# Patient Record
Sex: MALE | Employment: OTHER | URBAN - METROPOLITAN AREA
[De-identification: names, ages, dates, MRNs, and addresses within clinical notes are randomized per-mention and may not be internally consistent; named-entity substitution may affect disease eponyms.]

---

## 2017-07-31 ENCOUNTER — HOSPITAL ENCOUNTER (OUTPATIENT)
Dept: OTHER | Age: 67
Discharge: HOME | End: 2017-07-31
Attending: FAMILY MEDICINE | Admitting: FAMILY MEDICINE

## 2017-07-31 DIAGNOSIS — M54.12 CERVICAL RADICULOPATHY: ICD-10-CM

## 2017-07-31 DIAGNOSIS — M54.12 RADICULOPATHY, CERVICAL: ICD-10-CM

## 2018-06-28 ENCOUNTER — HOSPITAL ENCOUNTER (OUTPATIENT)
Dept: OTHER | Age: 68
Discharge: HOME | End: 2018-06-28
Attending: FAMILY MEDICINE | Admitting: FAMILY MEDICINE

## 2018-06-28 DIAGNOSIS — M54.12 RADICULOPATHY OF CERVICAL REGION: ICD-10-CM

## 2018-06-28 PROCEDURE — 95912 NRV CNDJ TEST 11-12 STUDIES: CPT | Performed by: PSYCHIATRY & NEUROLOGY

## 2018-06-28 PROCEDURE — 95886 MUSC TEST DONE W/N TEST COMP: CPT | Performed by: PSYCHIATRY & NEUROLOGY

## 2019-03-11 ENCOUNTER — HOSPITAL ENCOUNTER (EMERGENCY)
Age: 69
Discharge: HOME | End: 2019-03-11
Attending: EMERGENCY MEDICINE
Payer: MEDICAID

## 2019-03-11 ENCOUNTER — APPOINTMENT (OUTPATIENT)
Dept: CT IMAGING | Age: 69
End: 2019-03-11
Payer: MEDICAID

## 2019-03-11 ENCOUNTER — APPOINTMENT (OUTPATIENT)
Dept: GENERAL RADIOLOGY | Age: 69
End: 2019-03-11
Payer: MEDICAID

## 2019-03-11 VITALS
OXYGEN SATURATION: 91 % | SYSTOLIC BLOOD PRESSURE: 156 MMHG | WEIGHT: 176.25 LBS | RESPIRATION RATE: 20 BRPM | BODY MASS INDEX: 27.6 KG/M2 | DIASTOLIC BLOOD PRESSURE: 99 MMHG | TEMPERATURE: 98 F | HEART RATE: 98 BPM

## 2019-03-11 DIAGNOSIS — R10.13 EPIGASTRIC PAIN: Primary | ICD-10-CM

## 2019-03-11 LAB
A/G RATIO: 1.2 (ref 1.1–2.2)
ALBUMIN SERPL-MCNC: 5.1 G/DL (ref 3.4–5)
ALP BLD-CCNC: 97 U/L (ref 40–129)
ALT SERPL-CCNC: 21 U/L (ref 10–40)
AMYLASE: 158 U/L (ref 25–115)
ANION GAP SERPL CALCULATED.3IONS-SCNC: 14 MMOL/L (ref 3–16)
AST SERPL-CCNC: 21 U/L (ref 15–37)
BASOPHILS ABSOLUTE: 0.1 K/UL (ref 0–0.2)
BASOPHILS RELATIVE PERCENT: 0.4 %
BILIRUB SERPL-MCNC: 0.8 MG/DL (ref 0–1)
BILIRUBIN URINE: NEGATIVE
BLOOD, URINE: NEGATIVE
BUN BLDV-MCNC: 10 MG/DL (ref 7–20)
CALCIUM SERPL-MCNC: 9.7 MG/DL (ref 8.3–10.6)
CHLORIDE BLD-SCNC: 99 MMOL/L (ref 99–110)
CLARITY: CLEAR
CO2: 28 MMOL/L (ref 21–32)
COLOR: YELLOW
CREAT SERPL-MCNC: 0.8 MG/DL (ref 0.8–1.3)
EOSINOPHILS ABSOLUTE: 0 K/UL (ref 0–0.6)
EOSINOPHILS RELATIVE PERCENT: 0.4 %
EPITHELIAL CELLS, UA: 0 /HPF (ref 0–5)
GFR AFRICAN AMERICAN: >60
GFR NON-AFRICAN AMERICAN: >60
GLOBULIN: 4.1 G/DL
GLUCOSE BLD-MCNC: 131 MG/DL (ref 70–99)
GLUCOSE URINE: NEGATIVE MG/DL
HCT VFR BLD CALC: 45.6 % (ref 40.5–52.5)
HEMOGLOBIN: 15.2 G/DL (ref 13.5–17.5)
HYALINE CASTS: 1 /LPF (ref 0–8)
KETONES, URINE: NEGATIVE MG/DL
LEUKOCYTE ESTERASE, URINE: NEGATIVE
LIPASE: 45 U/L (ref 13–60)
LYMPHOCYTES ABSOLUTE: 0.8 K/UL (ref 1–5.1)
LYMPHOCYTES RELATIVE PERCENT: 7 %
MCH RBC QN AUTO: 28.1 PG (ref 26–34)
MCHC RBC AUTO-ENTMCNC: 33.4 G/DL (ref 31–36)
MCV RBC AUTO: 84.1 FL (ref 80–100)
MICROSCOPIC EXAMINATION: YES
MONOCYTES ABSOLUTE: 0.4 K/UL (ref 0–1.3)
MONOCYTES RELATIVE PERCENT: 3.1 %
NEUTROPHILS ABSOLUTE: 10.6 K/UL (ref 1.7–7.7)
NEUTROPHILS RELATIVE PERCENT: 89.1 %
NITRITE, URINE: NEGATIVE
PDW BLD-RTO: 13.4 % (ref 12.4–15.4)
PH UA: 7 (ref 5–8)
PLATELET # BLD: 222 K/UL (ref 135–450)
PMV BLD AUTO: 10.8 FL (ref 5–10.5)
POTASSIUM SERPL-SCNC: 3.9 MMOL/L (ref 3.5–5.1)
PROTEIN UA: 100 MG/DL
RBC # BLD: 5.42 M/UL (ref 4.2–5.9)
RBC UA: 1 /HPF (ref 0–4)
SODIUM BLD-SCNC: 141 MMOL/L (ref 136–145)
SPECIFIC GRAVITY UA: 1.01 (ref 1–1.03)
TOTAL PROTEIN: 9.2 G/DL (ref 6.4–8.2)
TROPONIN: <0.01 NG/ML
URINE TYPE: ABNORMAL
UROBILINOGEN, URINE: 0.2 E.U./DL
WBC # BLD: 11.9 K/UL (ref 4–11)
WBC UA: 1 /HPF (ref 0–5)

## 2019-03-11 PROCEDURE — 74177 CT ABD & PELVIS W/CONTRAST: CPT

## 2019-03-11 PROCEDURE — 93010 ELECTROCARDIOGRAM REPORT: CPT | Performed by: INTERNAL MEDICINE

## 2019-03-11 PROCEDURE — 6360000002 HC RX W HCPCS: Performed by: NURSE PRACTITIONER

## 2019-03-11 PROCEDURE — 71045 X-RAY EXAM CHEST 1 VIEW: CPT

## 2019-03-11 PROCEDURE — 2580000003 HC RX 258: Performed by: NURSE PRACTITIONER

## 2019-03-11 PROCEDURE — 6370000000 HC RX 637 (ALT 250 FOR IP): Performed by: NURSE PRACTITIONER

## 2019-03-11 PROCEDURE — 6360000004 HC RX CONTRAST MEDICATION: Performed by: NURSE PRACTITIONER

## 2019-03-11 PROCEDURE — 93005 ELECTROCARDIOGRAM TRACING: CPT | Performed by: NURSE PRACTITIONER

## 2019-03-11 PROCEDURE — 96375 TX/PRO/DX INJ NEW DRUG ADDON: CPT

## 2019-03-11 PROCEDURE — 96374 THER/PROPH/DIAG INJ IV PUSH: CPT

## 2019-03-11 PROCEDURE — 80053 COMPREHEN METABOLIC PANEL: CPT

## 2019-03-11 PROCEDURE — 96361 HYDRATE IV INFUSION ADD-ON: CPT

## 2019-03-11 PROCEDURE — 84484 ASSAY OF TROPONIN QUANT: CPT

## 2019-03-11 PROCEDURE — 99284 EMERGENCY DEPT VISIT MOD MDM: CPT

## 2019-03-11 PROCEDURE — 81001 URINALYSIS AUTO W/SCOPE: CPT

## 2019-03-11 PROCEDURE — 85025 COMPLETE CBC W/AUTO DIFF WBC: CPT

## 2019-03-11 PROCEDURE — 83690 ASSAY OF LIPASE: CPT

## 2019-03-11 PROCEDURE — 82150 ASSAY OF AMYLASE: CPT

## 2019-03-11 RX ORDER — METOPROLOL SUCCINATE 100 MG/1
100 TABLET, EXTENDED RELEASE ORAL DAILY
COMMUNITY

## 2019-03-11 RX ORDER — TRIAMTERENE AND HYDROCHLOROTHIAZIDE 37.5; 25 MG/1; MG/1
1 TABLET ORAL DAILY
COMMUNITY

## 2019-03-11 RX ORDER — MORPHINE SULFATE 4 MG/ML
4 INJECTION, SOLUTION INTRAMUSCULAR; INTRAVENOUS ONCE
Status: COMPLETED | OUTPATIENT
Start: 2019-03-11 | End: 2019-03-11

## 2019-03-11 RX ORDER — TIZANIDINE 2 MG/1
2 TABLET ORAL DAILY
COMMUNITY

## 2019-03-11 RX ORDER — DIPHENHYDRAMINE HYDROCHLORIDE 50 MG/ML
25 INJECTION INTRAMUSCULAR; INTRAVENOUS ONCE
Status: COMPLETED | OUTPATIENT
Start: 2019-03-11 | End: 2019-03-11

## 2019-03-11 RX ORDER — OMEPRAZOLE 20 MG/1
20 CAPSULE, DELAYED RELEASE ORAL DAILY
Qty: 30 CAPSULE | Refills: 0 | Status: SHIPPED | OUTPATIENT
Start: 2019-03-11 | End: 2019-04-10

## 2019-03-11 RX ORDER — LISINOPRIL 20 MG/1
20 TABLET ORAL DAILY
COMMUNITY

## 2019-03-11 RX ORDER — 0.9 % SODIUM CHLORIDE 0.9 %
1000 INTRAVENOUS SOLUTION INTRAVENOUS ONCE
Status: COMPLETED | OUTPATIENT
Start: 2019-03-11 | End: 2019-03-11

## 2019-03-11 RX ORDER — GABAPENTIN 100 MG/1
100 CAPSULE ORAL 3 TIMES DAILY
COMMUNITY

## 2019-03-11 RX ORDER — TAMSULOSIN HYDROCHLORIDE 0.4 MG/1
0.4 CAPSULE ORAL DAILY
COMMUNITY

## 2019-03-11 RX ORDER — ONDANSETRON 2 MG/ML
4 INJECTION INTRAMUSCULAR; INTRAVENOUS ONCE
Status: COMPLETED | OUTPATIENT
Start: 2019-03-11 | End: 2019-03-11

## 2019-03-11 RX ADMIN — ONDANSETRON 4 MG: 2 INJECTION INTRAMUSCULAR; INTRAVENOUS at 16:26

## 2019-03-11 RX ADMIN — DIPHENHYDRAMINE HYDROCHLORIDE 25 MG: 50 INJECTION INTRAMUSCULAR; INTRAVENOUS at 16:45

## 2019-03-11 RX ADMIN — IOPAMIDOL 75 ML: 755 INJECTION, SOLUTION INTRAVENOUS at 17:14

## 2019-03-11 RX ADMIN — LIDOCAINE HYDROCHLORIDE: 20 SOLUTION ORAL; TOPICAL at 16:43

## 2019-03-11 RX ADMIN — MORPHINE SULFATE 4 MG: 4 INJECTION INTRAVENOUS at 16:26

## 2019-03-11 RX ADMIN — SODIUM CHLORIDE 1000 ML: 9 INJECTION, SOLUTION INTRAVENOUS at 16:25

## 2019-03-11 ASSESSMENT — PAIN DESCRIPTION - LOCATION: LOCATION: ABDOMEN

## 2019-03-11 ASSESSMENT — ENCOUNTER SYMPTOMS
CONSTIPATION: 0
BLOOD IN STOOL: 0
ABDOMINAL PAIN: 1
SORE THROAT: 0
RHINORRHEA: 0
SHORTNESS OF BREATH: 0
NAUSEA: 1
DIARRHEA: 0
VOMITING: 1

## 2019-03-11 ASSESSMENT — PAIN DESCRIPTION - PAIN TYPE: TYPE: ACUTE PAIN

## 2019-03-12 LAB
EKG ATRIAL RATE: 93 BPM
EKG DIAGNOSIS: NORMAL
EKG P AXIS: 53 DEGREES
EKG P-R INTERVAL: 204 MS
EKG Q-T INTERVAL: 378 MS
EKG QRS DURATION: 98 MS
EKG QTC CALCULATION (BAZETT): 469 MS
EKG R AXIS: -47 DEGREES
EKG T AXIS: 28 DEGREES
EKG VENTRICULAR RATE: 93 BPM

## 2021-01-01 ENCOUNTER — HOSPITAL ENCOUNTER (INPATIENT)
Age: 71
LOS: 12 days | DRG: 196 | End: 2021-02-15
Attending: EMERGENCY MEDICINE | Admitting: INTERNAL MEDICINE
Payer: MEDICAID

## 2021-01-01 ENCOUNTER — APPOINTMENT (OUTPATIENT)
Dept: GENERAL RADIOLOGY | Age: 71
DRG: 196 | End: 2021-01-01
Payer: MEDICAID

## 2021-01-01 ENCOUNTER — APPOINTMENT (OUTPATIENT)
Dept: CT IMAGING | Age: 71
DRG: 196 | End: 2021-01-01
Payer: MEDICAID

## 2021-01-01 VITALS
HEIGHT: 67 IN | HEART RATE: 112 BPM | RESPIRATION RATE: 31 BRPM | OXYGEN SATURATION: 44 % | DIASTOLIC BLOOD PRESSURE: 63 MMHG | TEMPERATURE: 105.1 F | BODY MASS INDEX: 29.07 KG/M2 | SYSTOLIC BLOOD PRESSURE: 121 MMHG | WEIGHT: 185.2 LBS

## 2021-01-01 DIAGNOSIS — I46.9 CARDIAC ARREST (HCC): Primary | ICD-10-CM

## 2021-01-01 LAB
A/G RATIO: 1.3 (ref 1.1–2.2)
ABO/RH: NORMAL
ALBUMIN SERPL-MCNC: 3.4 G/DL (ref 3.4–5)
ALP BLD-CCNC: 94 U/L (ref 40–129)
ALT SERPL-CCNC: 101 U/L (ref 10–40)
ALT SERPL-CCNC: 41 U/L (ref 10–40)
ANION GAP SERPL CALCULATED.3IONS-SCNC: 10 MMOL/L (ref 3–16)
ANION GAP SERPL CALCULATED.3IONS-SCNC: 14 MMOL/L (ref 3–16)
ANION GAP SERPL CALCULATED.3IONS-SCNC: 15 MMOL/L (ref 3–16)
ANION GAP SERPL CALCULATED.3IONS-SCNC: 21 MMOL/L (ref 3–16)
ANION GAP SERPL CALCULATED.3IONS-SCNC: 4 MMOL/L (ref 3–16)
ANION GAP SERPL CALCULATED.3IONS-SCNC: 5 MMOL/L (ref 3–16)
ANION GAP SERPL CALCULATED.3IONS-SCNC: 7 MMOL/L (ref 3–16)
ANION GAP SERPL CALCULATED.3IONS-SCNC: 8 MMOL/L (ref 3–16)
ANION GAP SERPL CALCULATED.3IONS-SCNC: 8 MMOL/L (ref 3–16)
ANTIBODY SCREEN: NORMAL
AST SERPL-CCNC: 106 U/L (ref 15–37)
AST SERPL-CCNC: 63 U/L (ref 15–37)
BANDED NEUTROPHILS RELATIVE PERCENT: 1 % (ref 0–7)
BANDED NEUTROPHILS RELATIVE PERCENT: 2 % (ref 0–7)
BANDED NEUTROPHILS RELATIVE PERCENT: 2 % (ref 0–7)
BASE EXCESS ARTERIAL: -6.7 MMOL/L (ref -3–3)
BASE EXCESS ARTERIAL: -9.2 MMOL/L (ref -3–3)
BASE EXCESS ARTERIAL: 1.1 MMOL/L (ref -3–3)
BASE EXCESS ARTERIAL: 2.2 MMOL/L (ref -3–3)
BASE EXCESS ARTERIAL: 2.5 MMOL/L (ref -3–3)
BASE EXCESS VENOUS: -9.3 MMOL/L (ref -3–3)
BASOPHILS ABSOLUTE: 0 K/UL (ref 0–0.2)
BASOPHILS ABSOLUTE: 0.1 K/UL (ref 0–0.2)
BASOPHILS RELATIVE PERCENT: 0 %
BASOPHILS RELATIVE PERCENT: 0 %
BASOPHILS RELATIVE PERCENT: 0.1 %
BASOPHILS RELATIVE PERCENT: 0.3 %
BASOPHILS RELATIVE PERCENT: 0.5 %
BASOPHILS RELATIVE PERCENT: 0.5 %
BASOPHILS RELATIVE PERCENT: 0.9 %
BASOPHILS RELATIVE PERCENT: 1 %
BILIRUB SERPL-MCNC: <0.2 MG/DL (ref 0–1)
BLOOD CULTURE, ROUTINE: NORMAL
BUN BLDV-MCNC: 12 MG/DL (ref 7–20)
BUN BLDV-MCNC: 15 MG/DL (ref 7–20)
BUN BLDV-MCNC: 15 MG/DL (ref 7–20)
BUN BLDV-MCNC: 17 MG/DL (ref 7–20)
BUN BLDV-MCNC: 17 MG/DL (ref 7–20)
BUN BLDV-MCNC: 20 MG/DL (ref 7–20)
BUN BLDV-MCNC: 22 MG/DL (ref 7–20)
BUN BLDV-MCNC: 23 MG/DL (ref 7–20)
BUN BLDV-MCNC: 23 MG/DL (ref 7–20)
BUN BLDV-MCNC: 26 MG/DL (ref 7–20)
BUN BLDV-MCNC: 30 MG/DL (ref 7–20)
CALCIUM SERPL-MCNC: 7.6 MG/DL (ref 8.3–10.6)
CALCIUM SERPL-MCNC: 7.6 MG/DL (ref 8.3–10.6)
CALCIUM SERPL-MCNC: 7.8 MG/DL (ref 8.3–10.6)
CALCIUM SERPL-MCNC: 7.9 MG/DL (ref 8.3–10.6)
CALCIUM SERPL-MCNC: 8 MG/DL (ref 8.3–10.6)
CALCIUM SERPL-MCNC: 8 MG/DL (ref 8.3–10.6)
CALCIUM SERPL-MCNC: 8.1 MG/DL (ref 8.3–10.6)
CALCIUM SERPL-MCNC: 8.2 MG/DL (ref 8.3–10.6)
CALCIUM SERPL-MCNC: 8.8 MG/DL (ref 8.3–10.6)
CARBOXYHEMOGLOBIN ARTERIAL: 0.5 % (ref 0–1.5)
CARBOXYHEMOGLOBIN ARTERIAL: 0.8 % (ref 0–1.5)
CARBOXYHEMOGLOBIN ARTERIAL: 0.9 % (ref 0–1.5)
CARBOXYHEMOGLOBIN ARTERIAL: 0.9 % (ref 0–1.5)
CARBOXYHEMOGLOBIN ARTERIAL: 1.1 % (ref 0–1.5)
CARBOXYHEMOGLOBIN: 2.1 % (ref 0–1.5)
CHLORIDE BLD-SCNC: 100 MMOL/L (ref 99–110)
CHLORIDE BLD-SCNC: 101 MMOL/L (ref 99–110)
CHLORIDE BLD-SCNC: 105 MMOL/L (ref 99–110)
CHLORIDE BLD-SCNC: 106 MMOL/L (ref 99–110)
CHLORIDE BLD-SCNC: 106 MMOL/L (ref 99–110)
CHLORIDE BLD-SCNC: 108 MMOL/L (ref 99–110)
CHLORIDE BLD-SCNC: 108 MMOL/L (ref 99–110)
CHLORIDE BLD-SCNC: 111 MMOL/L (ref 99–110)
CHLORIDE BLD-SCNC: 111 MMOL/L (ref 99–110)
CO2: 19 MMOL/L (ref 21–32)
CO2: 20 MMOL/L (ref 21–32)
CO2: 24 MMOL/L (ref 21–32)
CO2: 24 MMOL/L (ref 21–32)
CO2: 25 MMOL/L (ref 21–32)
CO2: 26 MMOL/L (ref 21–32)
CO2: 26 MMOL/L (ref 21–32)
CO2: 27 MMOL/L (ref 21–32)
CO2: 28 MMOL/L (ref 21–32)
CO2: 29 MMOL/L (ref 21–32)
CO2: 29 MMOL/L (ref 21–32)
CREAT SERPL-MCNC: 0.6 MG/DL (ref 0.8–1.3)
CREAT SERPL-MCNC: 0.7 MG/DL (ref 0.8–1.3)
CREAT SERPL-MCNC: 0.7 MG/DL (ref 0.8–1.3)
CREAT SERPL-MCNC: 0.8 MG/DL (ref 0.8–1.3)
CREAT SERPL-MCNC: 0.9 MG/DL (ref 0.8–1.3)
CREAT SERPL-MCNC: 1 MG/DL (ref 0.8–1.3)
CREAT SERPL-MCNC: 1 MG/DL (ref 0.8–1.3)
CREAT SERPL-MCNC: 1.6 MG/DL (ref 0.8–1.3)
CREAT SERPL-MCNC: 2 MG/DL (ref 0.8–1.3)
CULTURE, RESPIRATORY: ABNORMAL
CULTURE, RESPIRATORY: ABNORMAL
EKG ATRIAL RATE: 76 BPM
EKG DIAGNOSIS: NORMAL
EKG P-R INTERVAL: 152 MS
EKG Q-T INTERVAL: 452 MS
EKG QRS DURATION: 156 MS
EKG QTC CALCULATION (BAZETT): 619 MS
EKG R AXIS: 269 DEGREES
EKG T AXIS: 227 DEGREES
EKG VENTRICULAR RATE: 113 BPM
EOSINOPHILS ABSOLUTE: 0 K/UL (ref 0–0.6)
EOSINOPHILS ABSOLUTE: 0.2 K/UL (ref 0–0.6)
EOSINOPHILS ABSOLUTE: 0.3 K/UL (ref 0–0.6)
EOSINOPHILS ABSOLUTE: 0.6 K/UL (ref 0–0.6)
EOSINOPHILS ABSOLUTE: 0.8 K/UL (ref 0–0.6)
EOSINOPHILS ABSOLUTE: 0.8 K/UL (ref 0–0.6)
EOSINOPHILS ABSOLUTE: 0.9 K/UL (ref 0–0.6)
EOSINOPHILS ABSOLUTE: 2.3 K/UL (ref 0–0.6)
EOSINOPHILS RELATIVE PERCENT: 0 %
EOSINOPHILS RELATIVE PERCENT: 0.1 %
EOSINOPHILS RELATIVE PERCENT: 0.3 %
EOSINOPHILS RELATIVE PERCENT: 1.2 %
EOSINOPHILS RELATIVE PERCENT: 2.6 %
EOSINOPHILS RELATIVE PERCENT: 20 %
EOSINOPHILS RELATIVE PERCENT: 4.4 %
EOSINOPHILS RELATIVE PERCENT: 6 %
EOSINOPHILS RELATIVE PERCENT: 7.4 %
EOSINOPHILS RELATIVE PERCENT: 7.5 %
GFR AFRICAN AMERICAN: 40
GFR AFRICAN AMERICAN: 52
GFR AFRICAN AMERICAN: >60
GFR NON-AFRICAN AMERICAN: 33
GFR NON-AFRICAN AMERICAN: 43
GFR NON-AFRICAN AMERICAN: >60
GLOBULIN: 2.6 G/DL
GLUCOSE BLD-MCNC: 106 MG/DL (ref 70–99)
GLUCOSE BLD-MCNC: 109 MG/DL (ref 70–99)
GLUCOSE BLD-MCNC: 111 MG/DL (ref 70–99)
GLUCOSE BLD-MCNC: 123 MG/DL (ref 70–99)
GLUCOSE BLD-MCNC: 123 MG/DL (ref 70–99)
GLUCOSE BLD-MCNC: 125 MG/DL (ref 70–99)
GLUCOSE BLD-MCNC: 131 MG/DL (ref 70–99)
GLUCOSE BLD-MCNC: 133 MG/DL (ref 70–99)
GLUCOSE BLD-MCNC: 134 MG/DL (ref 70–99)
GLUCOSE BLD-MCNC: 138 MG/DL (ref 70–99)
GLUCOSE BLD-MCNC: 143 MG/DL (ref 70–99)
GLUCOSE BLD-MCNC: 155 MG/DL (ref 70–99)
GLUCOSE BLD-MCNC: 167 MG/DL (ref 70–99)
GLUCOSE BLD-MCNC: 170 MG/DL (ref 70–99)
GLUCOSE BLD-MCNC: 181 MG/DL (ref 70–99)
GLUCOSE BLD-MCNC: 258 MG/DL (ref 70–99)
GLUCOSE BLD-MCNC: 96 MG/DL (ref 70–99)
GLUCOSE BLD-MCNC: 96 MG/DL (ref 70–99)
GRAM STAIN RESULT: ABNORMAL
HCO3 ARTERIAL: 18.2 MMOL/L (ref 21–29)
HCO3 ARTERIAL: 21.9 MMOL/L (ref 21–29)
HCO3 ARTERIAL: 25.6 MMOL/L (ref 21–29)
HCO3 ARTERIAL: 25.9 MMOL/L (ref 21–29)
HCO3 ARTERIAL: 28 MMOL/L (ref 21–29)
HCO3 VENOUS: 23.2 MMOL/L (ref 23–29)
HCT VFR BLD CALC: 31.8 % (ref 40.5–52.5)
HCT VFR BLD CALC: 31.8 % (ref 40.5–52.5)
HCT VFR BLD CALC: 32 % (ref 40.5–52.5)
HCT VFR BLD CALC: 32.5 % (ref 40.5–52.5)
HCT VFR BLD CALC: 32.6 % (ref 40.5–52.5)
HCT VFR BLD CALC: 34.5 % (ref 40.5–52.5)
HCT VFR BLD CALC: 35.8 % (ref 40.5–52.5)
HCT VFR BLD CALC: 36.5 % (ref 40.5–52.5)
HCT VFR BLD CALC: 40.9 % (ref 40.5–52.5)
HCT VFR BLD CALC: 43.5 % (ref 40.5–52.5)
HCT VFR BLD CALC: 49.4 % (ref 40.5–52.5)
HEMOGLOBIN, ART, EXTENDED: 10.8 G/DL (ref 13.5–17.5)
HEMOGLOBIN, ART, EXTENDED: 10.9 G/DL (ref 13.5–17.5)
HEMOGLOBIN, ART, EXTENDED: 11 G/DL (ref 13.5–17.5)
HEMOGLOBIN, ART, EXTENDED: 15.4 G/DL (ref 13.5–17.5)
HEMOGLOBIN, ART, EXTENDED: 16 G/DL (ref 13.5–17.5)
HEMOGLOBIN, VEN, REDUCED: 18 %
HEMOGLOBIN: 10.2 G/DL (ref 13.5–17.5)
HEMOGLOBIN: 10.4 G/DL (ref 13.5–17.5)
HEMOGLOBIN: 10.5 G/DL (ref 13.5–17.5)
HEMOGLOBIN: 10.5 G/DL (ref 13.5–17.5)
HEMOGLOBIN: 10.7 G/DL (ref 13.5–17.5)
HEMOGLOBIN: 11.2 G/DL (ref 13.5–17.5)
HEMOGLOBIN: 11.7 G/DL (ref 13.5–17.5)
HEMOGLOBIN: 11.8 G/DL (ref 13.5–17.5)
HEMOGLOBIN: 13.3 G/DL (ref 13.5–17.5)
HEMOGLOBIN: 13.7 G/DL (ref 13.5–17.5)
HEMOGLOBIN: 15.7 G/DL (ref 13.5–17.5)
INR BLD: 1.14 (ref 0.86–1.14)
KEPPRA DOSE AMT: NORMAL
KEPPRA: 29.6 UG/ML (ref 6–46)
LV EF: 50 %
LVEF MODALITY: NORMAL
LYMPHOCYTES ABSOLUTE: 0.5 K/UL (ref 1–5.1)
LYMPHOCYTES ABSOLUTE: 0.8 K/UL (ref 1–5.1)
LYMPHOCYTES ABSOLUTE: 0.8 K/UL (ref 1–5.1)
LYMPHOCYTES ABSOLUTE: 0.9 K/UL (ref 1–5.1)
LYMPHOCYTES ABSOLUTE: 1.1 K/UL (ref 1–5.1)
LYMPHOCYTES ABSOLUTE: 1.3 K/UL (ref 1–5.1)
LYMPHOCYTES ABSOLUTE: 1.4 K/UL (ref 1–5.1)
LYMPHOCYTES ABSOLUTE: 1.4 K/UL (ref 1–5.1)
LYMPHOCYTES ABSOLUTE: 2 K/UL (ref 1–5.1)
LYMPHOCYTES ABSOLUTE: 4.8 K/UL (ref 1–5.1)
LYMPHOCYTES RELATIVE PERCENT: 10 %
LYMPHOCYTES RELATIVE PERCENT: 18 %
LYMPHOCYTES RELATIVE PERCENT: 2 %
LYMPHOCYTES RELATIVE PERCENT: 4.8 %
LYMPHOCYTES RELATIVE PERCENT: 43.4 %
LYMPHOCYTES RELATIVE PERCENT: 5.1 %
LYMPHOCYTES RELATIVE PERCENT: 6.8 %
LYMPHOCYTES RELATIVE PERCENT: 7.4 %
LYMPHOCYTES RELATIVE PERCENT: 7.4 %
LYMPHOCYTES RELATIVE PERCENT: 9.3 %
MAGNESIUM: 2.5 MG/DL (ref 1.8–2.4)
MCH RBC QN AUTO: 27.6 PG (ref 26–34)
MCH RBC QN AUTO: 27.7 PG (ref 26–34)
MCH RBC QN AUTO: 27.8 PG (ref 26–34)
MCH RBC QN AUTO: 27.9 PG (ref 26–34)
MCH RBC QN AUTO: 28.1 PG (ref 26–34)
MCH RBC QN AUTO: 28.1 PG (ref 26–34)
MCH RBC QN AUTO: 28.2 PG (ref 26–34)
MCH RBC QN AUTO: 28.3 PG (ref 26–34)
MCH RBC QN AUTO: 29 PG (ref 26–34)
MCHC RBC AUTO-ENTMCNC: 31.6 G/DL (ref 31–36)
MCHC RBC AUTO-ENTMCNC: 31.8 G/DL (ref 31–36)
MCHC RBC AUTO-ENTMCNC: 32.1 G/DL (ref 31–36)
MCHC RBC AUTO-ENTMCNC: 32.3 G/DL (ref 31–36)
MCHC RBC AUTO-ENTMCNC: 32.3 G/DL (ref 31–36)
MCHC RBC AUTO-ENTMCNC: 32.4 G/DL (ref 31–36)
MCHC RBC AUTO-ENTMCNC: 32.4 G/DL (ref 31–36)
MCHC RBC AUTO-ENTMCNC: 32.6 G/DL (ref 31–36)
MCHC RBC AUTO-ENTMCNC: 32.6 G/DL (ref 31–36)
MCHC RBC AUTO-ENTMCNC: 32.8 G/DL (ref 31–36)
MCHC RBC AUTO-ENTMCNC: 33.5 G/DL (ref 31–36)
MCV RBC AUTO: 85.2 FL (ref 80–100)
MCV RBC AUTO: 85.2 FL (ref 80–100)
MCV RBC AUTO: 86 FL (ref 80–100)
MCV RBC AUTO: 86 FL (ref 80–100)
MCV RBC AUTO: 86.4 FL (ref 80–100)
MCV RBC AUTO: 86.4 FL (ref 80–100)
MCV RBC AUTO: 86.6 FL (ref 80–100)
MCV RBC AUTO: 86.8 FL (ref 80–100)
MCV RBC AUTO: 87 FL (ref 80–100)
MCV RBC AUTO: 87.9 FL (ref 80–100)
MCV RBC AUTO: 89.6 FL (ref 80–100)
METAMYELOCYTES RELATIVE PERCENT: 2 %
METAMYELOCYTES RELATIVE PERCENT: 2 %
METHEMOGLOBIN ARTERIAL: 0.3 %
METHEMOGLOBIN ARTERIAL: 0.4 %
METHEMOGLOBIN ARTERIAL: 0.5 %
METHEMOGLOBIN ARTERIAL: 0.6 %
METHEMOGLOBIN ARTERIAL: 0.8 %
METHEMOGLOBIN VENOUS: 0.3 %
MONOCYTES ABSOLUTE: 0.5 K/UL (ref 0–1.3)
MONOCYTES ABSOLUTE: 0.8 K/UL (ref 0–1.3)
MONOCYTES ABSOLUTE: 0.9 K/UL (ref 0–1.3)
MONOCYTES ABSOLUTE: 1 K/UL (ref 0–1.3)
MONOCYTES ABSOLUTE: 1.2 K/UL (ref 0–1.3)
MONOCYTES ABSOLUTE: 1.2 K/UL (ref 0–1.3)
MONOCYTES ABSOLUTE: 1.5 K/UL (ref 0–1.3)
MONOCYTES ABSOLUTE: 1.5 K/UL (ref 0–1.3)
MONOCYTES ABSOLUTE: 1.6 K/UL (ref 0–1.3)
MONOCYTES ABSOLUTE: 1.9 K/UL (ref 0–1.3)
MONOCYTES RELATIVE PERCENT: 10 %
MONOCYTES RELATIVE PERCENT: 11 %
MONOCYTES RELATIVE PERCENT: 11.3 %
MONOCYTES RELATIVE PERCENT: 3 %
MONOCYTES RELATIVE PERCENT: 4.1 %
MONOCYTES RELATIVE PERCENT: 7 %
MONOCYTES RELATIVE PERCENT: 8.1 %
MONOCYTES RELATIVE PERCENT: 8.4 %
MONOCYTES RELATIVE PERCENT: 8.5 %
MONOCYTES RELATIVE PERCENT: 8.7 %
MYELOCYTE PERCENT: 2 %
NEUTROPHILS ABSOLUTE: 11.1 K/UL (ref 1.7–7.7)
NEUTROPHILS ABSOLUTE: 16.3 K/UL (ref 1.7–7.7)
NEUTROPHILS ABSOLUTE: 19.8 K/UL (ref 1.7–7.7)
NEUTROPHILS ABSOLUTE: 38.5 K/UL (ref 1.7–7.7)
NEUTROPHILS ABSOLUTE: 5.4 K/UL (ref 1.7–7.7)
NEUTROPHILS ABSOLUTE: 6.2 K/UL (ref 1.7–7.7)
NEUTROPHILS ABSOLUTE: 7.8 K/UL (ref 1.7–7.7)
NEUTROPHILS ABSOLUTE: 8.5 K/UL (ref 1.7–7.7)
NEUTROPHILS ABSOLUTE: 9.1 K/UL (ref 1.7–7.7)
NEUTROPHILS ABSOLUTE: 9.8 K/UL (ref 1.7–7.7)
NEUTROPHILS RELATIVE PERCENT: 49 %
NEUTROPHILS RELATIVE PERCENT: 49 %
NEUTROPHILS RELATIVE PERCENT: 69 %
NEUTROPHILS RELATIVE PERCENT: 73.6 %
NEUTROPHILS RELATIVE PERCENT: 76 %
NEUTROPHILS RELATIVE PERCENT: 76.5 %
NEUTROPHILS RELATIVE PERCENT: 83.1 %
NEUTROPHILS RELATIVE PERCENT: 85.6 %
NEUTROPHILS RELATIVE PERCENT: 86.5 %
NEUTROPHILS RELATIVE PERCENT: 93 %
O2 CONTENT ARTERIAL: 14 ML/DL
O2 CONTENT ARTERIAL: 15 ML/DL
O2 CONTENT ARTERIAL: 15 ML/DL
O2 CONTENT ARTERIAL: 21 ML/DL
O2 CONTENT ARTERIAL: 21 ML/DL
O2 CONTENT, VEN: 16 VOL %
O2 SAT, ARTERIAL: 92.8 %
O2 SAT, ARTERIAL: 94.8 %
O2 SAT, ARTERIAL: 96.5 %
O2 SAT, ARTERIAL: 97.7 %
O2 SAT, ARTERIAL: 99 %
O2 SAT, VEN: 82 %
O2 THERAPY: ABNORMAL
ORGANISM: ABNORMAL
PCO2 ARTERIAL: 34.9 MMHG (ref 35–45)
PCO2 ARTERIAL: 41 MMHG (ref 35–45)
PCO2 ARTERIAL: 43.9 MMHG (ref 35–45)
PCO2 ARTERIAL: 46.5 MMHG (ref 35–45)
PCO2 ARTERIAL: 54.5 MMHG (ref 35–45)
PCO2, VEN: 85.5 MMHG (ref 40–50)
PDW BLD-RTO: 13 % (ref 12.4–15.4)
PDW BLD-RTO: 13.1 % (ref 12.4–15.4)
PDW BLD-RTO: 13.2 % (ref 12.4–15.4)
PDW BLD-RTO: 13.3 % (ref 12.4–15.4)
PDW BLD-RTO: 13.4 % (ref 12.4–15.4)
PDW BLD-RTO: 13.5 % (ref 12.4–15.4)
PERFORMED ON: ABNORMAL
PERFORMED ON: NORMAL
PERFORMED ON: NORMAL
PH ARTERIAL: 7.21 (ref 7.35–7.45)
PH ARTERIAL: 7.23 (ref 7.35–7.45)
PH ARTERIAL: 7.39 (ref 7.35–7.45)
PH ARTERIAL: 7.41 (ref 7.35–7.45)
PH ARTERIAL: 7.47 (ref 7.35–7.45)
PH VENOUS: 7.04 (ref 7.35–7.45)
PHENYTOIN DOSE AMOUNT: ABNORMAL
PHENYTOIN LEVEL: 3 UG/ML (ref 10–20)
PLATELET # BLD: 127 K/UL (ref 135–450)
PLATELET # BLD: 128 K/UL (ref 135–450)
PLATELET # BLD: 142 K/UL (ref 135–450)
PLATELET # BLD: 178 K/UL (ref 135–450)
PLATELET # BLD: 180 K/UL (ref 135–450)
PLATELET # BLD: 217 K/UL (ref 135–450)
PLATELET # BLD: 225 K/UL (ref 135–450)
PLATELET # BLD: 257 K/UL (ref 135–450)
PLATELET # BLD: 351 K/UL (ref 135–450)
PLATELET # BLD: 429 K/UL (ref 135–450)
PLATELET # BLD: 532 K/UL (ref 135–450)
PLATELET SLIDE REVIEW: ABNORMAL
PLATELET SLIDE REVIEW: ADEQUATE
PLATELET SLIDE REVIEW: ADEQUATE
PMV BLD AUTO: 8.2 FL (ref 5–10.5)
PMV BLD AUTO: 8.3 FL (ref 5–10.5)
PMV BLD AUTO: 8.4 FL (ref 5–10.5)
PMV BLD AUTO: 8.8 FL (ref 5–10.5)
PMV BLD AUTO: 8.8 FL (ref 5–10.5)
PMV BLD AUTO: 9 FL (ref 5–10.5)
PMV BLD AUTO: 9.1 FL (ref 5–10.5)
PMV BLD AUTO: 9.4 FL (ref 5–10.5)
PMV BLD AUTO: 9.4 FL (ref 5–10.5)
PMV BLD AUTO: 9.7 FL (ref 5–10.5)
PMV BLD AUTO: 9.9 FL (ref 5–10.5)
PO2 ARTERIAL: 101 MMHG (ref 75–108)
PO2 ARTERIAL: 112 MMHG (ref 75–108)
PO2 ARTERIAL: 66.3 MMHG (ref 75–108)
PO2 ARTERIAL: 70 MMHG (ref 75–108)
PO2 ARTERIAL: 91.9 MMHG (ref 75–108)
PO2, VEN: 65.9 MMHG (ref 25–40)
POTASSIUM REFLEX MAGNESIUM: 4.4 MMOL/L (ref 3.5–5.1)
POTASSIUM SERPL-SCNC: 2.7 MMOL/L (ref 3.5–5.1)
POTASSIUM SERPL-SCNC: 3.5 MMOL/L (ref 3.5–5.1)
POTASSIUM SERPL-SCNC: 3.7 MMOL/L (ref 3.5–5.1)
POTASSIUM SERPL-SCNC: 3.8 MMOL/L (ref 3.5–5.1)
POTASSIUM SERPL-SCNC: 4.1 MMOL/L (ref 3.5–5.1)
POTASSIUM SERPL-SCNC: 4.1 MMOL/L (ref 3.5–5.1)
POTASSIUM SERPL-SCNC: 4.3 MMOL/L (ref 3.5–5.1)
PRO-BNP: 146 PG/ML (ref 0–124)
PROCALCITONIN: 0.34 NG/ML (ref 0–0.15)
PROTHROMBIN TIME: 13.2 SEC (ref 10–13.2)
RBC # BLD: 3.7 M/UL (ref 4.2–5.9)
RBC # BLD: 3.7 M/UL (ref 4.2–5.9)
RBC # BLD: 3.73 M/UL (ref 4.2–5.9)
RBC # BLD: 3.76 M/UL (ref 4.2–5.9)
RBC # BLD: 3.79 M/UL (ref 4.2–5.9)
RBC # BLD: 3.97 M/UL (ref 4.2–5.9)
RBC # BLD: 4.13 M/UL (ref 4.2–5.9)
RBC # BLD: 4.2 M/UL (ref 4.2–5.9)
RBC # BLD: 4.81 M/UL (ref 4.2–5.9)
RBC # BLD: 4.85 M/UL (ref 4.2–5.9)
RBC # BLD: 5.62 M/UL (ref 4.2–5.9)
RBC # BLD: NORMAL 10*6/UL
SARS-COV-2, NAAT: NOT DETECTED
SARS-COV-2: NOT DETECTED
SLIDE REVIEW: ABNORMAL
SODIUM BLD-SCNC: 135 MMOL/L (ref 136–145)
SODIUM BLD-SCNC: 137 MMOL/L (ref 136–145)
SODIUM BLD-SCNC: 138 MMOL/L (ref 136–145)
SODIUM BLD-SCNC: 139 MMOL/L (ref 136–145)
SODIUM BLD-SCNC: 140 MMOL/L (ref 136–145)
SODIUM BLD-SCNC: 140 MMOL/L (ref 136–145)
SODIUM BLD-SCNC: 141 MMOL/L (ref 136–145)
SODIUM BLD-SCNC: 142 MMOL/L (ref 136–145)
SODIUM BLD-SCNC: 144 MMOL/L (ref 136–145)
SODIUM BLD-SCNC: 146 MMOL/L (ref 136–145)
SODIUM BLD-SCNC: 147 MMOL/L (ref 136–145)
TCO2 ARTERIAL: 43.8 MMOL/L
TCO2 ARTERIAL: 52.8 MMOL/L
TCO2 ARTERIAL: 59.8 MMOL/L
TCO2 ARTERIAL: 60.8 MMOL/L
TCO2 ARTERIAL: 66 MMOL/L
TCO2 CALC VENOUS: 58 MMOL/L
TOTAL PROTEIN: 6 G/DL (ref 6.4–8.2)
TROPONIN: 0.28 NG/ML
TROPONIN: 1.88 NG/ML
VANCOMYCIN RANDOM: 10.7 UG/ML
WBC # BLD: 10.5 K/UL (ref 4–11)
WBC # BLD: 11.1 K/UL (ref 4–11)
WBC # BLD: 11.2 K/UL (ref 4–11)
WBC # BLD: 11.3 K/UL (ref 4–11)
WBC # BLD: 11.9 K/UL (ref 4–11)
WBC # BLD: 13.6 K/UL (ref 4–11)
WBC # BLD: 14.6 K/UL (ref 4–11)
WBC # BLD: 19.6 K/UL (ref 4–11)
WBC # BLD: 22.9 K/UL (ref 4–11)
WBC # BLD: 40.5 K/UL (ref 4–11)
WBC # BLD: 9.9 K/UL (ref 4–11)

## 2021-01-01 PROCEDURE — 6360000002 HC RX W HCPCS: Performed by: INTERNAL MEDICINE

## 2021-01-01 PROCEDURE — 5A1955Z RESPIRATORY VENTILATION, GREATER THAN 96 CONSECUTIVE HOURS: ICD-10-PCS | Performed by: EMERGENCY MEDICINE

## 2021-01-01 PROCEDURE — 71045 X-RAY EXAM CHEST 1 VIEW: CPT

## 2021-01-01 PROCEDURE — 6370000000 HC RX 637 (ALT 250 FOR IP): Performed by: INTERNAL MEDICINE

## 2021-01-01 PROCEDURE — 36415 COLL VENOUS BLD VENIPUNCTURE: CPT

## 2021-01-01 PROCEDURE — 2580000003 HC RX 258: Performed by: INTERNAL MEDICINE

## 2021-01-01 PROCEDURE — 2580000003 HC RX 258: Performed by: PSYCHIATRY & NEUROLOGY

## 2021-01-01 PROCEDURE — 86901 BLOOD TYPING SEROLOGIC RH(D): CPT

## 2021-01-01 PROCEDURE — 93005 ELECTROCARDIOGRAM TRACING: CPT | Performed by: STUDENT IN AN ORGANIZED HEALTH CARE EDUCATION/TRAINING PROGRAM

## 2021-01-01 PROCEDURE — 80048 BASIC METABOLIC PNL TOTAL CA: CPT

## 2021-01-01 PROCEDURE — 99291 CRITICAL CARE FIRST HOUR: CPT | Performed by: INTERNAL MEDICINE

## 2021-01-01 PROCEDURE — 96374 THER/PROPH/DIAG INJ IV PUSH: CPT

## 2021-01-01 PROCEDURE — 94761 N-INVAS EAR/PLS OXIMETRY MLT: CPT

## 2021-01-01 PROCEDURE — 6360000002 HC RX W HCPCS: Performed by: PSYCHIATRY & NEUROLOGY

## 2021-01-01 PROCEDURE — 94640 AIRWAY INHALATION TREATMENT: CPT

## 2021-01-01 PROCEDURE — 2000000000 HC ICU R&B

## 2021-01-01 PROCEDURE — U0002 COVID-19 LAB TEST NON-CDC: HCPCS

## 2021-01-01 PROCEDURE — 2700000000 HC OXYGEN THERAPY PER DAY

## 2021-01-01 PROCEDURE — 95819 EEG AWAKE AND ASLEEP: CPT

## 2021-01-01 PROCEDURE — 6360000002 HC RX W HCPCS: Performed by: EMERGENCY MEDICINE

## 2021-01-01 PROCEDURE — 94003 VENT MGMT INPAT SUBQ DAY: CPT

## 2021-01-01 PROCEDURE — 6360000002 HC RX W HCPCS

## 2021-01-01 PROCEDURE — C9113 INJ PANTOPRAZOLE SODIUM, VIA: HCPCS | Performed by: INTERNAL MEDICINE

## 2021-01-01 PROCEDURE — 74018 RADEX ABDOMEN 1 VIEW: CPT

## 2021-01-01 PROCEDURE — 70450 CT HEAD/BRAIN W/O DYE: CPT

## 2021-01-01 PROCEDURE — 87040 BLOOD CULTURE FOR BACTERIA: CPT

## 2021-01-01 PROCEDURE — 82803 BLOOD GASES ANY COMBINATION: CPT

## 2021-01-01 PROCEDURE — 85025 COMPLETE CBC W/AUTO DIFF WBC: CPT

## 2021-01-01 PROCEDURE — 83880 ASSAY OF NATRIURETIC PEPTIDE: CPT

## 2021-01-01 PROCEDURE — 6370000000 HC RX 637 (ALT 250 FOR IP): Performed by: STUDENT IN AN ORGANIZED HEALTH CARE EDUCATION/TRAINING PROGRAM

## 2021-01-01 PROCEDURE — 95822 EEG COMA OR SLEEP ONLY: CPT | Performed by: PSYCHIATRY & NEUROLOGY

## 2021-01-01 PROCEDURE — 6360000002 HC RX W HCPCS: Performed by: STUDENT IN AN ORGANIZED HEALTH CARE EDUCATION/TRAINING PROGRAM

## 2021-01-01 PROCEDURE — 99233 SBSQ HOSP IP/OBS HIGH 50: CPT | Performed by: PSYCHIATRY & NEUROLOGY

## 2021-01-01 PROCEDURE — 99223 1ST HOSP IP/OBS HIGH 75: CPT | Performed by: PSYCHIATRY & NEUROLOGY

## 2021-01-01 PROCEDURE — 84145 PROCALCITONIN (PCT): CPT

## 2021-01-01 PROCEDURE — 84460 ALANINE AMINO (ALT) (SGPT): CPT

## 2021-01-01 PROCEDURE — 36592 COLLECT BLOOD FROM PICC: CPT

## 2021-01-01 PROCEDURE — 99283 EMERGENCY DEPT VISIT LOW MDM: CPT

## 2021-01-01 PROCEDURE — 93306 TTE W/DOPPLER COMPLETE: CPT

## 2021-01-01 PROCEDURE — 86850 RBC ANTIBODY SCREEN: CPT

## 2021-01-01 PROCEDURE — 2500000003 HC RX 250 WO HCPCS: Performed by: EMERGENCY MEDICINE

## 2021-01-01 PROCEDURE — 94002 VENT MGMT INPAT INIT DAY: CPT

## 2021-01-01 PROCEDURE — 80177 DRUG SCRN QUAN LEVETIRACETAM: CPT

## 2021-01-01 PROCEDURE — 99233 SBSQ HOSP IP/OBS HIGH 50: CPT | Performed by: INTERNAL MEDICINE

## 2021-01-01 PROCEDURE — 87070 CULTURE OTHR SPECIMN AEROBIC: CPT

## 2021-01-01 PROCEDURE — 80185 ASSAY OF PHENYTOIN TOTAL: CPT

## 2021-01-01 PROCEDURE — 80053 COMPREHEN METABOLIC PANEL: CPT

## 2021-01-01 PROCEDURE — 85610 PROTHROMBIN TIME: CPT

## 2021-01-01 PROCEDURE — 72125 CT NECK SPINE W/O DYE: CPT

## 2021-01-01 PROCEDURE — 80202 ASSAY OF VANCOMYCIN: CPT

## 2021-01-01 PROCEDURE — 06HY33Z INSERTION OF INFUSION DEVICE INTO LOWER VEIN, PERCUTANEOUS APPROACH: ICD-10-PCS | Performed by: EMERGENCY MEDICINE

## 2021-01-01 PROCEDURE — 87077 CULTURE AEROBIC IDENTIFY: CPT

## 2021-01-01 PROCEDURE — 84484 ASSAY OF TROPONIN QUANT: CPT

## 2021-01-01 PROCEDURE — 71250 CT THORAX DX C-: CPT

## 2021-01-01 PROCEDURE — 96365 THER/PROPH/DIAG IV INF INIT: CPT

## 2021-01-01 PROCEDURE — 96375 TX/PRO/DX INJ NEW DRUG ADDON: CPT

## 2021-01-01 PROCEDURE — 87185 SC STD ENZYME DETCJ PER NZM: CPT

## 2021-01-01 PROCEDURE — 2500000003 HC RX 250 WO HCPCS: Performed by: INTERNAL MEDICINE

## 2021-01-01 PROCEDURE — 36556 INSERT NON-TUNNEL CV CATH: CPT

## 2021-01-01 PROCEDURE — 2580000003 HC RX 258: Performed by: EMERGENCY MEDICINE

## 2021-01-01 PROCEDURE — 83735 ASSAY OF MAGNESIUM: CPT

## 2021-01-01 PROCEDURE — 96376 TX/PRO/DX INJ SAME DRUG ADON: CPT

## 2021-01-01 PROCEDURE — 87205 SMEAR GRAM STAIN: CPT

## 2021-01-01 PROCEDURE — 93010 ELECTROCARDIOGRAM REPORT: CPT | Performed by: INTERNAL MEDICINE

## 2021-01-01 PROCEDURE — 84450 TRANSFERASE (AST) (SGOT): CPT

## 2021-01-01 PROCEDURE — 85027 COMPLETE CBC AUTOMATED: CPT

## 2021-01-01 PROCEDURE — 86900 BLOOD TYPING SEROLOGIC ABO: CPT

## 2021-01-01 PROCEDURE — U0003 INFECTIOUS AGENT DETECTION BY NUCLEIC ACID (DNA OR RNA); SEVERE ACUTE RESPIRATORY SYNDROME CORONAVIRUS 2 (SARS-COV-2) (CORONAVIRUS DISEASE [COVID-19]), AMPLIFIED PROBE TECHNIQUE, MAKING USE OF HIGH THROUGHPUT TECHNOLOGIES AS DESCRIBED BY CMS-2020-01-R: HCPCS

## 2021-01-01 RX ORDER — LORAZEPAM 2 MG/ML
4 INJECTION INTRAMUSCULAR EVERY 5 MIN PRN
Status: DISCONTINUED | OUTPATIENT
Start: 2021-01-01 | End: 2021-02-16 | Stop reason: HOSPADM

## 2021-01-01 RX ORDER — 0.9 % SODIUM CHLORIDE 0.9 %
500 INTRAVENOUS SOLUTION INTRAVENOUS ONCE
Status: COMPLETED | OUTPATIENT
Start: 2021-01-01 | End: 2021-01-01

## 2021-01-01 RX ORDER — CALCIUM CHLORIDE 100 MG/ML
INJECTION INTRAVENOUS; INTRAVENTRICULAR DAILY PRN
Status: COMPLETED | OUTPATIENT
Start: 2021-01-01 | End: 2021-01-01

## 2021-01-01 RX ORDER — MORPHINE SULFATE 4 MG/ML
10 INJECTION, SOLUTION INTRAMUSCULAR; INTRAVENOUS EVERY 30 MIN PRN
Status: DISCONTINUED | OUTPATIENT
Start: 2021-01-01 | End: 2021-02-16 | Stop reason: HOSPADM

## 2021-01-01 RX ORDER — MIDAZOLAM HYDROCHLORIDE 2 MG/2ML
4 INJECTION, SOLUTION INTRAMUSCULAR; INTRAVENOUS ONCE
Status: COMPLETED | OUTPATIENT
Start: 2021-01-01 | End: 2021-01-01

## 2021-01-01 RX ORDER — MAGNESIUM SULFATE IN WATER 40 MG/ML
2000 INJECTION, SOLUTION INTRAVENOUS PRN
Status: DISCONTINUED | OUTPATIENT
Start: 2021-01-01 | End: 2021-01-01

## 2021-01-01 RX ORDER — KETAMINE HYDROCHLORIDE 100 MG/ML
INJECTION, SOLUTION INTRAMUSCULAR; INTRAVENOUS DAILY PRN
Status: COMPLETED | OUTPATIENT
Start: 2021-01-01 | End: 2021-01-01

## 2021-01-01 RX ORDER — ATROPINE SULFATE 0.1 MG/ML
INJECTION INTRAVENOUS DAILY PRN
Status: COMPLETED | OUTPATIENT
Start: 2021-01-01 | End: 2021-01-01

## 2021-01-01 RX ORDER — MORPHINE SULFATE 4 MG/ML
5 INJECTION, SOLUTION INTRAMUSCULAR; INTRAVENOUS
Status: DISCONTINUED | OUTPATIENT
Start: 2021-01-01 | End: 2021-01-01

## 2021-01-01 RX ORDER — SODIUM CHLORIDE 0.9 % (FLUSH) 0.9 %
10 SYRINGE (ML) INJECTION PRN
Status: DISCONTINUED | OUTPATIENT
Start: 2021-01-01 | End: 2021-01-01

## 2021-01-01 RX ORDER — LORAZEPAM 2 MG/ML
5 INJECTION INTRAMUSCULAR EVERY 4 HOURS PRN
Status: DISCONTINUED | OUTPATIENT
Start: 2021-01-01 | End: 2021-01-01

## 2021-01-01 RX ORDER — LEVETIRACETAM 10 MG/ML
1000 INJECTION INTRAVASCULAR
Status: COMPLETED | OUTPATIENT
Start: 2021-01-01 | End: 2021-01-01

## 2021-01-01 RX ORDER — PANTOPRAZOLE SODIUM 40 MG/10ML
40 INJECTION, POWDER, LYOPHILIZED, FOR SOLUTION INTRAVENOUS DAILY
Status: DISCONTINUED | OUTPATIENT
Start: 2021-01-01 | End: 2021-01-01

## 2021-01-01 RX ORDER — PROMETHAZINE HYDROCHLORIDE 25 MG/1
12.5 TABLET ORAL EVERY 6 HOURS PRN
Status: DISCONTINUED | OUTPATIENT
Start: 2021-01-01 | End: 2021-02-16 | Stop reason: HOSPADM

## 2021-01-01 RX ORDER — LORAZEPAM 2 MG/ML
2 INJECTION INTRAMUSCULAR ONCE
Status: COMPLETED | OUTPATIENT
Start: 2021-01-01 | End: 2021-01-01

## 2021-01-01 RX ORDER — METHYLPREDNISOLONE SODIUM SUCCINATE 125 MG/2ML
60 INJECTION, POWDER, LYOPHILIZED, FOR SOLUTION INTRAMUSCULAR; INTRAVENOUS ONCE
Status: COMPLETED | OUTPATIENT
Start: 2021-01-01 | End: 2021-01-01

## 2021-01-01 RX ORDER — ACETAMINOPHEN 325 MG/1
650 TABLET ORAL EVERY 4 HOURS PRN
Status: DISCONTINUED | OUTPATIENT
Start: 2021-01-01 | End: 2021-02-16 | Stop reason: HOSPADM

## 2021-01-01 RX ORDER — MIDAZOLAM HYDROCHLORIDE 2 MG/2ML
2 INJECTION, SOLUTION INTRAMUSCULAR; INTRAVENOUS EVERY 10 MIN PRN
Status: DISCONTINUED | OUTPATIENT
Start: 2021-01-01 | End: 2021-02-16 | Stop reason: HOSPADM

## 2021-01-01 RX ORDER — LORAZEPAM 2 MG/ML
INJECTION INTRAMUSCULAR
Status: COMPLETED
Start: 2021-01-01 | End: 2021-01-01

## 2021-01-01 RX ORDER — LEVETIRACETAM 5 MG/ML
500 INJECTION INTRAVASCULAR EVERY 12 HOURS
Status: DISCONTINUED | OUTPATIENT
Start: 2021-01-01 | End: 2021-01-01

## 2021-01-01 RX ORDER — EPINEPHRINE 0.1 MG/ML
SYRINGE (ML) INJECTION DAILY PRN
Status: COMPLETED | OUTPATIENT
Start: 2021-01-01 | End: 2021-01-01

## 2021-01-01 RX ORDER — FUROSEMIDE 10 MG/ML
40 INJECTION INTRAMUSCULAR; INTRAVENOUS ONCE
Status: COMPLETED | OUTPATIENT
Start: 2021-01-01 | End: 2021-01-01

## 2021-01-01 RX ORDER — PROPOFOL 10 MG/ML
5-50 INJECTION, EMULSION INTRAVENOUS
Status: DISCONTINUED | OUTPATIENT
Start: 2021-01-01 | End: 2021-01-01

## 2021-01-01 RX ORDER — DEXMEDETOMIDINE HYDROCHLORIDE 4 UG/ML
.2-1.4 INJECTION, SOLUTION INTRAVENOUS CONTINUOUS
Status: DISCONTINUED | OUTPATIENT
Start: 2021-01-01 | End: 2021-01-01

## 2021-01-01 RX ORDER — ROCURONIUM BROMIDE 10 MG/ML
INJECTION, SOLUTION INTRAVENOUS
Status: DISPENSED
Start: 2021-01-01 | End: 2021-01-01

## 2021-01-01 RX ORDER — LORAZEPAM 2 MG/ML
1 INJECTION INTRAMUSCULAR ONCE
Status: COMPLETED | OUTPATIENT
Start: 2021-01-01 | End: 2021-01-01

## 2021-01-01 RX ORDER — KETAMINE HYDROCHLORIDE 100 MG/ML
200 INJECTION, SOLUTION INTRAMUSCULAR; INTRAVENOUS ONCE
Status: COMPLETED | OUTPATIENT
Start: 2021-01-01 | End: 2021-01-01

## 2021-01-01 RX ORDER — MIDAZOLAM HYDROCHLORIDE 2 MG/2ML
2 INJECTION, SOLUTION INTRAMUSCULAR; INTRAVENOUS ONCE
Status: COMPLETED | OUTPATIENT
Start: 2021-01-01 | End: 2021-01-01

## 2021-01-01 RX ORDER — LORAZEPAM 2 MG/ML
2 INJECTION INTRAMUSCULAR EVERY 4 HOURS PRN
Status: DISCONTINUED | OUTPATIENT
Start: 2021-01-01 | End: 2021-01-01

## 2021-01-01 RX ORDER — LEVETIRACETAM 100 MG/ML
500 SOLUTION ORAL ONCE
Status: COMPLETED | OUTPATIENT
Start: 2021-01-01 | End: 2021-01-01

## 2021-01-01 RX ORDER — FUROSEMIDE 10 MG/ML
20 INJECTION INTRAMUSCULAR; INTRAVENOUS ONCE
Status: COMPLETED | OUTPATIENT
Start: 2021-01-01 | End: 2021-01-01

## 2021-01-01 RX ORDER — MORPHINE SULFATE 10 MG/ML
5 INJECTION, SOLUTION INTRAMUSCULAR; INTRAVENOUS
Status: DISCONTINUED | OUTPATIENT
Start: 2021-01-01 | End: 2021-01-01

## 2021-01-01 RX ORDER — ETOMIDATE 2 MG/ML
INJECTION INTRAVENOUS
Status: DISPENSED
Start: 2021-01-01 | End: 2021-01-01

## 2021-01-01 RX ORDER — ALBUTEROL SULFATE 90 UG/1
6 AEROSOL, METERED RESPIRATORY (INHALATION) EVERY 4 HOURS
Status: DISCONTINUED | OUTPATIENT
Start: 2021-01-01 | End: 2021-02-16 | Stop reason: HOSPADM

## 2021-01-01 RX ORDER — ONDANSETRON 2 MG/ML
4 INJECTION INTRAMUSCULAR; INTRAVENOUS EVERY 6 HOURS PRN
Status: DISCONTINUED | OUTPATIENT
Start: 2021-01-01 | End: 2021-02-16 | Stop reason: HOSPADM

## 2021-01-01 RX ORDER — ACETAMINOPHEN 650 MG/1
650 SUPPOSITORY RECTAL EVERY 4 HOURS PRN
Status: DISCONTINUED | OUTPATIENT
Start: 2021-01-01 | End: 2021-02-16 | Stop reason: HOSPADM

## 2021-01-01 RX ORDER — SODIUM CHLORIDE 0.9 % (FLUSH) 0.9 %
10 SYRINGE (ML) INJECTION EVERY 12 HOURS SCHEDULED
Status: DISCONTINUED | OUTPATIENT
Start: 2021-01-01 | End: 2021-02-16 | Stop reason: HOSPADM

## 2021-01-01 RX ORDER — POTASSIUM CHLORIDE 7.45 MG/ML
10 INJECTION INTRAVENOUS PRN
Status: DISCONTINUED | OUTPATIENT
Start: 2021-01-01 | End: 2021-01-01

## 2021-01-01 RX ORDER — LEVETIRACETAM 10 MG/ML
1000 INJECTION INTRAVASCULAR EVERY 12 HOURS
Status: DISCONTINUED | OUTPATIENT
Start: 2021-01-01 | End: 2021-01-01

## 2021-01-01 RX ORDER — SENNA PLUS 8.6 MG/1
1 TABLET ORAL NIGHTLY
Status: DISCONTINUED | OUTPATIENT
Start: 2021-01-01 | End: 2021-02-16 | Stop reason: HOSPADM

## 2021-01-01 RX ORDER — LORAZEPAM 2 MG/ML
5 INJECTION INTRAMUSCULAR
Status: DISCONTINUED | OUTPATIENT
Start: 2021-01-01 | End: 2021-02-16 | Stop reason: HOSPADM

## 2021-01-01 RX ADMIN — SODIUM CHLORIDE 1500 MG: 900 INJECTION INTRAVENOUS at 14:53

## 2021-01-01 RX ADMIN — Medication 6 PUFF: at 19:30

## 2021-01-01 RX ADMIN — Medication 6 PUFF: at 11:30

## 2021-01-01 RX ADMIN — SODIUM CHLORIDE 1500 MG: 900 INJECTION INTRAVENOUS at 21:29

## 2021-01-01 RX ADMIN — MIDAZOLAM 10 MG/HR: 5 INJECTION, SOLUTION INTRAMUSCULAR; INTRAVENOUS at 18:13

## 2021-01-01 RX ADMIN — EPINEPHRINE 1 MG: 0.1 INJECTION, SOLUTION ENDOTRACHEAL; INTRACARDIAC; INTRAVENOUS at 17:14

## 2021-01-01 RX ADMIN — SODIUM CHLORIDE 1500 MG: 900 INJECTION INTRAVENOUS at 14:57

## 2021-01-01 RX ADMIN — ACETAMINOPHEN 650 MG: 325 TABLET ORAL at 05:43

## 2021-01-01 RX ADMIN — CALCIUM CHLORIDE 1000 MG: 100 INJECTION INTRAVENOUS; INTRAVENTRICULAR at 17:27

## 2021-01-01 RX ADMIN — LEVETIRACETAM 1000 MG: 10 INJECTION INTRAVENOUS at 08:46

## 2021-01-01 RX ADMIN — MORPHINE SULFATE 10 MG: 4 INJECTION INTRAVENOUS at 12:34

## 2021-01-01 RX ADMIN — MORPHINE SULFATE 5 MG: 10 INJECTION INTRAVENOUS at 03:22

## 2021-01-01 RX ADMIN — FOSPHENYTOIN SODIUM 100 MG PE: 50 INJECTION, SOLUTION INTRAMUSCULAR; INTRAVENOUS at 12:54

## 2021-01-01 RX ADMIN — Medication 6 PUFF: at 16:46

## 2021-01-01 RX ADMIN — Medication 6 PUFF: at 20:38

## 2021-01-01 RX ADMIN — LORAZEPAM 4 MG: 2 INJECTION INTRAMUSCULAR; INTRAVENOUS at 19:59

## 2021-01-01 RX ADMIN — PROPOFOL 50 MCG/KG/MIN: 10 INJECTION, EMULSION INTRAVENOUS at 03:48

## 2021-01-01 RX ADMIN — LORAZEPAM 4 MG: 2 INJECTION INTRAMUSCULAR; INTRAVENOUS at 05:34

## 2021-01-01 RX ADMIN — LORAZEPAM 5 MG: 2 INJECTION INTRAMUSCULAR; INTRAVENOUS at 04:26

## 2021-01-01 RX ADMIN — PROPOFOL 60 MCG/KG/MIN: 10 INJECTION, EMULSION INTRAVENOUS at 03:06

## 2021-01-01 RX ADMIN — Medication 6 PUFF: at 03:11

## 2021-01-01 RX ADMIN — Medication 6 PUFF: at 23:17

## 2021-01-01 RX ADMIN — PROPOFOL 45 MCG/KG/MIN: 10 INJECTION, EMULSION INTRAVENOUS at 00:24

## 2021-01-01 RX ADMIN — PANTOPRAZOLE SODIUM 40 MG: 40 INJECTION, POWDER, FOR SOLUTION INTRAVENOUS at 09:45

## 2021-01-01 RX ADMIN — MORPHINE SULFATE 5 MG: 10 INJECTION INTRAVENOUS at 09:54

## 2021-01-01 RX ADMIN — KETAMINE HYDROCHLORIDE 200 MG: 100 INJECTION INTRAMUSCULAR; INTRAVENOUS at 19:55

## 2021-01-01 RX ADMIN — MORPHINE SULFATE 5 MG: 10 INJECTION INTRAVENOUS at 00:28

## 2021-01-01 RX ADMIN — FOSPHENYTOIN SODIUM 100 MG PE: 50 INJECTION, SOLUTION INTRAMUSCULAR; INTRAVENOUS at 01:23

## 2021-01-01 RX ADMIN — PROPOFOL 60 MCG/KG/MIN: 10 INJECTION, EMULSION INTRAVENOUS at 22:51

## 2021-01-01 RX ADMIN — LORAZEPAM 4 MG: 2 INJECTION INTRAMUSCULAR; INTRAVENOUS at 20:12

## 2021-01-01 RX ADMIN — PROPOFOL 50 MCG/KG/MIN: 10 INJECTION, EMULSION INTRAVENOUS at 00:04

## 2021-01-01 RX ADMIN — METHYLPREDNISOLONE SODIUM SUCCINATE 60 MG: 125 INJECTION, POWDER, FOR SOLUTION INTRAMUSCULAR; INTRAVENOUS at 09:15

## 2021-01-01 RX ADMIN — PROPOFOL 60 MCG/KG/MIN: 10 INJECTION, EMULSION INTRAVENOUS at 19:39

## 2021-01-01 RX ADMIN — Medication 10 ML: at 08:11

## 2021-01-01 RX ADMIN — Medication 6 PUFF: at 21:16

## 2021-01-01 RX ADMIN — Medication 6 PUFF: at 15:42

## 2021-01-01 RX ADMIN — FOSPHENYTOIN SODIUM 100 MG PE: 50 INJECTION, SOLUTION INTRAMUSCULAR; INTRAVENOUS at 01:33

## 2021-01-01 RX ADMIN — SODIUM CHLORIDE 1500 MG: 900 INJECTION INTRAVENOUS at 19:54

## 2021-01-01 RX ADMIN — PROPOFOL 50 MCG/KG/MIN: 10 INJECTION, EMULSION INTRAVENOUS at 16:43

## 2021-01-01 RX ADMIN — Medication 25 MCG/HR: at 00:47

## 2021-01-01 RX ADMIN — Medication 6 PUFF: at 00:02

## 2021-01-01 RX ADMIN — Medication 10 ML: at 21:05

## 2021-01-01 RX ADMIN — PROPOFOL 60 MCG/KG/MIN: 10 INJECTION, EMULSION INTRAVENOUS at 14:00

## 2021-01-01 RX ADMIN — Medication 6 PUFF: at 09:15

## 2021-01-01 RX ADMIN — MIDAZOLAM 10 MG/HR: 5 INJECTION, SOLUTION INTRAMUSCULAR; INTRAVENOUS at 06:31

## 2021-01-01 RX ADMIN — SODIUM CHLORIDE 1500 MG: 900 INJECTION INTRAVENOUS at 04:43

## 2021-01-01 RX ADMIN — Medication 6 PUFF: at 09:13

## 2021-01-01 RX ADMIN — FOSPHENYTOIN SODIUM 100 MG PE: 50 INJECTION, SOLUTION INTRAMUSCULAR; INTRAVENOUS at 01:58

## 2021-01-01 RX ADMIN — Medication 10 ML: at 08:15

## 2021-01-01 RX ADMIN — LORAZEPAM 5 MG: 2 INJECTION INTRAMUSCULAR; INTRAVENOUS at 09:01

## 2021-01-01 RX ADMIN — VANCOMYCIN HYDROCHLORIDE 750 MG: 750 INJECTION, POWDER, LYOPHILIZED, FOR SOLUTION INTRAVENOUS at 23:17

## 2021-01-01 RX ADMIN — MORPHINE SULFATE 5 MG: 10 INJECTION INTRAVENOUS at 03:52

## 2021-01-01 RX ADMIN — PANTOPRAZOLE SODIUM 40 MG: 40 INJECTION, POWDER, FOR SOLUTION INTRAVENOUS at 08:04

## 2021-01-01 RX ADMIN — MORPHINE SULFATE 5 MG: 10 INJECTION INTRAVENOUS at 05:24

## 2021-01-01 RX ADMIN — PROPOFOL 55 MCG/KG/MIN: 10 INJECTION, EMULSION INTRAVENOUS at 18:46

## 2021-01-01 RX ADMIN — SODIUM CHLORIDE 500 ML: 9 INJECTION, SOLUTION INTRAVENOUS at 20:08

## 2021-01-01 RX ADMIN — MORPHINE SULFATE 5 MG/HR: 10 INJECTION INTRAVENOUS at 12:50

## 2021-01-01 RX ADMIN — MIDAZOLAM 4 MG: 1 INJECTION INTRAMUSCULAR; INTRAVENOUS at 10:48

## 2021-01-01 RX ADMIN — MORPHINE SULFATE 5 MG: 4 INJECTION, SOLUTION INTRAMUSCULAR; INTRAVENOUS at 19:59

## 2021-01-01 RX ADMIN — LEVETIRACETAM 1000 MG: 10 INJECTION INTRAVENOUS at 09:45

## 2021-01-01 RX ADMIN — PROPOFOL 60 MCG/KG/MIN: 10 INJECTION, EMULSION INTRAVENOUS at 11:00

## 2021-01-01 RX ADMIN — PROPOFOL 55 MCG/KG/MIN: 10 INJECTION, EMULSION INTRAVENOUS at 00:31

## 2021-01-01 RX ADMIN — LORAZEPAM 4 MG: 2 INJECTION INTRAMUSCULAR; INTRAVENOUS at 02:24

## 2021-01-01 RX ADMIN — LORAZEPAM 4 MG: 2 INJECTION INTRAMUSCULAR; INTRAVENOUS at 03:22

## 2021-01-01 RX ADMIN — ENOXAPARIN SODIUM 40 MG: 40 INJECTION SUBCUTANEOUS at 16:20

## 2021-01-01 RX ADMIN — PANTOPRAZOLE SODIUM 40 MG: 40 INJECTION, POWDER, FOR SOLUTION INTRAVENOUS at 09:11

## 2021-01-01 RX ADMIN — Medication 10 ML: at 10:03

## 2021-01-01 RX ADMIN — Medication 10 ML: at 08:46

## 2021-01-01 RX ADMIN — LORAZEPAM 5 MG: 2 INJECTION INTRAMUSCULAR; INTRAVENOUS at 11:38

## 2021-01-01 RX ADMIN — PROPOFOL 50 MCG/KG/MIN: 10 INJECTION, EMULSION INTRAVENOUS at 04:40

## 2021-01-01 RX ADMIN — MIDAZOLAM 2 MG: 1 INJECTION INTRAMUSCULAR; INTRAVENOUS at 11:48

## 2021-01-01 RX ADMIN — MORPHINE SULFATE 5 MG: 10 INJECTION INTRAVENOUS at 02:12

## 2021-01-01 RX ADMIN — Medication 6 PUFF: at 13:03

## 2021-01-01 RX ADMIN — Medication 10 ML: at 09:46

## 2021-01-01 RX ADMIN — DEXMEDETOMIDINE HYDROCHLORIDE 0.2 MCG/KG/HR: 4 INJECTION, SOLUTION INTRAVENOUS at 08:39

## 2021-01-01 RX ADMIN — MIDAZOLAM 10 MG/HR: 5 INJECTION, SOLUTION INTRAMUSCULAR; INTRAVENOUS at 12:51

## 2021-01-01 RX ADMIN — SODIUM CHLORIDE 1500 MG: 900 INJECTION INTRAVENOUS at 15:41

## 2021-01-01 RX ADMIN — PROPOFOL 50 MCG/KG/MIN: 10 INJECTION, EMULSION INTRAVENOUS at 12:28

## 2021-01-01 RX ADMIN — LORAZEPAM 5 MG: 2 INJECTION INTRAMUSCULAR; INTRAVENOUS at 09:12

## 2021-01-01 RX ADMIN — CEFEPIME HYDROCHLORIDE 2000 MG: 2 INJECTION, POWDER, FOR SOLUTION INTRAVENOUS at 11:59

## 2021-01-01 RX ADMIN — Medication 10 ML: at 20:18

## 2021-01-01 RX ADMIN — LEVETIRACETAM 1000 MG: 10 INJECTION INTRAVENOUS at 20:17

## 2021-01-01 RX ADMIN — LEVETIRACETAM 1000 MG: 10 INJECTION INTRAVENOUS at 21:51

## 2021-01-01 RX ADMIN — MIDAZOLAM 10 MG/HR: 5 INJECTION, SOLUTION INTRAMUSCULAR; INTRAVENOUS at 19:15

## 2021-01-01 RX ADMIN — Medication 10 ML: at 09:40

## 2021-01-01 RX ADMIN — LORAZEPAM 5 MG: 2 INJECTION INTRAMUSCULAR; INTRAVENOUS at 19:57

## 2021-01-01 RX ADMIN — Medication 10 ML: at 09:15

## 2021-01-01 RX ADMIN — PROPOFOL 60 MCG/KG/MIN: 10 INJECTION, EMULSION INTRAVENOUS at 21:28

## 2021-01-01 RX ADMIN — Medication 6 PUFF: at 23:45

## 2021-01-01 RX ADMIN — Medication 6 PUFF: at 15:31

## 2021-01-01 RX ADMIN — Medication 25 MCG/HR: at 07:52

## 2021-01-01 RX ADMIN — SODIUM CHLORIDE 1500 MG: 900 INJECTION INTRAVENOUS at 03:37

## 2021-01-01 RX ADMIN — ENOXAPARIN SODIUM 40 MG: 40 INJECTION SUBCUTANEOUS at 08:15

## 2021-01-01 RX ADMIN — SODIUM CHLORIDE 1500 MG: 900 INJECTION INTRAVENOUS at 02:39

## 2021-01-01 RX ADMIN — LORAZEPAM 5 MG: 2 INJECTION INTRAMUSCULAR; INTRAVENOUS at 11:03

## 2021-01-01 RX ADMIN — LORAZEPAM 5 MG: 2 INJECTION INTRAMUSCULAR; INTRAVENOUS at 03:39

## 2021-01-01 RX ADMIN — PANTOPRAZOLE SODIUM 40 MG: 40 INJECTION, POWDER, FOR SOLUTION INTRAVENOUS at 07:31

## 2021-01-01 RX ADMIN — PANTOPRAZOLE SODIUM 40 MG: 40 INJECTION, POWDER, FOR SOLUTION INTRAVENOUS at 08:31

## 2021-01-01 RX ADMIN — PROPOFOL 45 MCG/KG/MIN: 10 INJECTION, EMULSION INTRAVENOUS at 00:03

## 2021-01-01 RX ADMIN — FOSPHENYTOIN SODIUM 100 MG PE: 50 INJECTION, SOLUTION INTRAMUSCULAR; INTRAVENOUS at 13:57

## 2021-01-01 RX ADMIN — LORAZEPAM 5 MG: 2 INJECTION INTRAMUSCULAR; INTRAVENOUS at 05:20

## 2021-01-01 RX ADMIN — MORPHINE SULFATE 5 MG: 10 INJECTION INTRAVENOUS at 22:40

## 2021-01-01 RX ADMIN — FOSPHENYTOIN SODIUM 100 MG PE: 50 INJECTION, SOLUTION INTRAMUSCULAR; INTRAVENOUS at 14:41

## 2021-01-01 RX ADMIN — SODIUM CHLORIDE 1500 MG: 900 INJECTION INTRAVENOUS at 09:59

## 2021-01-01 RX ADMIN — PROPOFOL 50 MCG/KG/MIN: 10 INJECTION, EMULSION INTRAVENOUS at 08:00

## 2021-01-01 RX ADMIN — EPINEPHRINE 1 MG: 0.1 INJECTION, SOLUTION ENDOTRACHEAL; INTRACARDIAC; INTRAVENOUS at 17:32

## 2021-01-01 RX ADMIN — Medication 10 ML: at 20:43

## 2021-01-01 RX ADMIN — LORAZEPAM 4 MG: 2 INJECTION INTRAMUSCULAR; INTRAVENOUS at 03:27

## 2021-01-01 RX ADMIN — Medication 10 ML: at 22:47

## 2021-01-01 RX ADMIN — DOCUSATE SODIUM 100 MG: 50 LIQUID ORAL at 08:15

## 2021-01-01 RX ADMIN — Medication 55 MCG/MIN: at 22:18

## 2021-01-01 RX ADMIN — MIDAZOLAM 10 MG/HR: 5 INJECTION, SOLUTION INTRAMUSCULAR; INTRAVENOUS at 16:10

## 2021-01-01 RX ADMIN — Medication 6 PUFF: at 07:36

## 2021-01-01 RX ADMIN — MORPHINE SULFATE 5 MG: 4 INJECTION, SOLUTION INTRAMUSCULAR; INTRAVENOUS at 13:07

## 2021-01-01 RX ADMIN — MORPHINE SULFATE 5 MG: 4 INJECTION, SOLUTION INTRAMUSCULAR; INTRAVENOUS at 12:12

## 2021-01-01 RX ADMIN — DOCUSATE SODIUM 100 MG: 50 LIQUID ORAL at 09:45

## 2021-01-01 RX ADMIN — PROPOFOL 60 MCG/KG/MIN: 10 INJECTION, EMULSION INTRAVENOUS at 07:31

## 2021-01-01 RX ADMIN — Medication 6 PUFF: at 12:53

## 2021-01-01 RX ADMIN — MIDAZOLAM 10 MG/HR: 5 INJECTION, SOLUTION INTRAMUSCULAR; INTRAVENOUS at 05:27

## 2021-01-01 RX ADMIN — MIDAZOLAM 10 MG/HR: 5 INJECTION, SOLUTION INTRAMUSCULAR; INTRAVENOUS at 12:28

## 2021-01-01 RX ADMIN — PROPOFOL 60 MCG/KG/MIN: 10 INJECTION, EMULSION INTRAVENOUS at 05:32

## 2021-01-01 RX ADMIN — LEVETIRACETAM 1000 MG: 10 INJECTION INTRAVENOUS at 20:48

## 2021-01-01 RX ADMIN — FOSPHENYTOIN SODIUM 100 MG PE: 50 INJECTION, SOLUTION INTRAMUSCULAR; INTRAVENOUS at 13:08

## 2021-01-01 RX ADMIN — PROPOFOL 50 MCG/KG/MIN: 10 INJECTION, EMULSION INTRAVENOUS at 19:06

## 2021-01-01 RX ADMIN — LORAZEPAM 4 MG: 2 INJECTION INTRAMUSCULAR; INTRAVENOUS at 00:27

## 2021-01-01 RX ADMIN — LORAZEPAM 5 MG: 2 INJECTION INTRAMUSCULAR; INTRAVENOUS at 10:34

## 2021-01-01 RX ADMIN — LORAZEPAM 2 MG: 2 INJECTION INTRAMUSCULAR; INTRAVENOUS at 10:16

## 2021-01-01 RX ADMIN — Medication 10 ML: at 20:57

## 2021-01-01 RX ADMIN — ENOXAPARIN SODIUM 40 MG: 40 INJECTION SUBCUTANEOUS at 09:46

## 2021-01-01 RX ADMIN — SODIUM CHLORIDE 1500 MG: 900 INJECTION INTRAVENOUS at 11:16

## 2021-01-01 RX ADMIN — MIDAZOLAM 10 MG/HR: 5 INJECTION, SOLUTION INTRAMUSCULAR; INTRAVENOUS at 03:08

## 2021-01-01 RX ADMIN — Medication 60 MCG/MIN: at 03:27

## 2021-01-01 RX ADMIN — LORAZEPAM 4 MG: 2 INJECTION INTRAMUSCULAR; INTRAVENOUS at 05:24

## 2021-01-01 RX ADMIN — SODIUM CHLORIDE 1500 MG: 900 INJECTION INTRAVENOUS at 15:07

## 2021-01-01 RX ADMIN — Medication 50 MCG/HR: at 12:28

## 2021-01-01 RX ADMIN — Medication 50 MCG/HR: at 18:13

## 2021-01-01 RX ADMIN — LORAZEPAM 1 MG: 2 INJECTION INTRAMUSCULAR; INTRAVENOUS at 08:16

## 2021-01-01 RX ADMIN — MIDAZOLAM 10 MG/HR: 5 INJECTION, SOLUTION INTRAMUSCULAR; INTRAVENOUS at 07:18

## 2021-01-01 RX ADMIN — LEVETIRACETAM 1000 MG: 10 INJECTION INTRAVENOUS at 20:21

## 2021-01-01 RX ADMIN — PROPOFOL 50 MCG/KG/MIN: 10 INJECTION, EMULSION INTRAVENOUS at 12:32

## 2021-01-01 RX ADMIN — Medication 25 MCG/HR: at 22:19

## 2021-01-01 RX ADMIN — LORAZEPAM 5 MG: 2 INJECTION INTRAMUSCULAR; INTRAVENOUS at 13:24

## 2021-01-01 RX ADMIN — Medication 25 MCG/HR: at 20:30

## 2021-01-01 RX ADMIN — Medication 6 PUFF: at 00:19

## 2021-01-01 RX ADMIN — PROPOFOL 10 MCG/KG/MIN: 10 INJECTION, EMULSION INTRAVENOUS at 17:57

## 2021-01-01 RX ADMIN — Medication 6 PUFF: at 03:59

## 2021-01-01 RX ADMIN — SODIUM CHLORIDE 1500 MG: 900 INJECTION INTRAVENOUS at 02:19

## 2021-01-01 RX ADMIN — Medication 25 MCG/HR: at 04:45

## 2021-01-01 RX ADMIN — SENNOSIDES 8.6 MG: 8.6 TABLET, FILM COATED ORAL at 20:19

## 2021-01-01 RX ADMIN — PROPOFOL 50 MCG/KG/MIN: 10 INJECTION, EMULSION INTRAVENOUS at 09:19

## 2021-01-01 RX ADMIN — PANTOPRAZOLE SODIUM 40 MG: 40 INJECTION, POWDER, FOR SOLUTION INTRAVENOUS at 09:39

## 2021-01-01 RX ADMIN — LORAZEPAM 4 MG: 2 INJECTION INTRAMUSCULAR; INTRAVENOUS at 03:51

## 2021-01-01 RX ADMIN — MORPHINE SULFATE 10 MG: 4 INJECTION INTRAVENOUS at 11:36

## 2021-01-01 RX ADMIN — DOCUSATE SODIUM 100 MG: 50 LIQUID ORAL at 12:54

## 2021-01-01 RX ADMIN — Medication 10 ML: at 08:23

## 2021-01-01 RX ADMIN — ACETAMINOPHEN 650 MG: 325 TABLET ORAL at 21:17

## 2021-01-01 RX ADMIN — PROPOFOL 70 MCG/KG/MIN: 10 INJECTION, EMULSION INTRAVENOUS at 15:41

## 2021-01-01 RX ADMIN — LEVETIRACETAM 1500 MG: 100 INJECTION, SOLUTION INTRAVENOUS at 11:14

## 2021-01-01 RX ADMIN — LORAZEPAM 5 MG: 2 INJECTION INTRAMUSCULAR; INTRAVENOUS at 00:58

## 2021-01-01 RX ADMIN — FOSPHENYTOIN SODIUM 100 MG PE: 50 INJECTION, SOLUTION INTRAMUSCULAR; INTRAVENOUS at 01:27

## 2021-01-01 RX ADMIN — PROPOFOL 60 MCG/KG/MIN: 10 INJECTION, EMULSION INTRAVENOUS at 23:56

## 2021-01-01 RX ADMIN — PROPOFOL 60 MCG/KG/MIN: 10 INJECTION, EMULSION INTRAVENOUS at 00:34

## 2021-01-01 RX ADMIN — LEVETIRACETAM 500 MG: 500 SOLUTION ORAL at 13:52

## 2021-01-01 RX ADMIN — MORPHINE SULFATE 5 MG: 4 INJECTION, SOLUTION INTRAMUSCULAR; INTRAVENOUS at 18:06

## 2021-01-01 RX ADMIN — LORAZEPAM 5 MG: 2 INJECTION INTRAMUSCULAR; INTRAVENOUS at 20:50

## 2021-01-01 RX ADMIN — LORAZEPAM 2 MG: 2 INJECTION INTRAMUSCULAR; INTRAVENOUS at 00:21

## 2021-01-01 RX ADMIN — PROPOFOL 50 MCG/KG/MIN: 10 INJECTION, EMULSION INTRAVENOUS at 15:13

## 2021-01-01 RX ADMIN — Medication 6 PUFF: at 03:13

## 2021-01-01 RX ADMIN — PROPOFOL 60 MCG/KG/MIN: 10 INJECTION, EMULSION INTRAVENOUS at 04:34

## 2021-01-01 RX ADMIN — LORAZEPAM 4 MG: 2 INJECTION INTRAMUSCULAR; INTRAVENOUS at 22:29

## 2021-01-01 RX ADMIN — LORAZEPAM 2 MG: 2 INJECTION INTRAMUSCULAR; INTRAVENOUS at 06:14

## 2021-01-01 RX ADMIN — LORAZEPAM 2 MG: 2 INJECTION INTRAMUSCULAR; INTRAVENOUS at 08:04

## 2021-01-01 RX ADMIN — LEVETIRACETAM 500 MG: 5 INJECTION INTRAVENOUS at 19:53

## 2021-01-01 RX ADMIN — LEVETIRACETAM 500 MG: 5 INJECTION INTRAVENOUS at 08:20

## 2021-01-01 RX ADMIN — POTASSIUM CHLORIDE 10 MEQ: 7.46 INJECTION, SOLUTION INTRAVENOUS at 03:14

## 2021-01-01 RX ADMIN — SODIUM CHLORIDE 1500 MG: 900 INJECTION INTRAVENOUS at 14:51

## 2021-01-01 RX ADMIN — KETAMINE HYDROCHLORIDE 60 MG: 100 INJECTION INTRAMUSCULAR; INTRAVENOUS at 17:29

## 2021-01-01 RX ADMIN — PROPOFOL 60 MCG/KG/MIN: 10 INJECTION, EMULSION INTRAVENOUS at 16:53

## 2021-01-01 RX ADMIN — Medication 10 ML: at 00:28

## 2021-01-01 RX ADMIN — PROPOFOL 50 MCG/KG/MIN: 10 INJECTION, EMULSION INTRAVENOUS at 06:09

## 2021-01-01 RX ADMIN — PROPOFOL 60 MCG/KG/MIN: 10 INJECTION, EMULSION INTRAVENOUS at 13:44

## 2021-01-01 RX ADMIN — LORAZEPAM 4 MG: 2 INJECTION INTRAMUSCULAR; INTRAVENOUS at 11:46

## 2021-01-01 RX ADMIN — LEVETIRACETAM 1000 MG: 10 INJECTION INTRAVENOUS at 20:12

## 2021-01-01 RX ADMIN — Medication 10 ML: at 20:35

## 2021-01-01 RX ADMIN — MIDAZOLAM 10 MG/HR: 5 INJECTION, SOLUTION INTRAMUSCULAR; INTRAVENOUS at 20:44

## 2021-01-01 RX ADMIN — PROPOFOL 50 MCG/KG/MIN: 10 INJECTION, EMULSION INTRAVENOUS at 12:30

## 2021-01-01 RX ADMIN — FUROSEMIDE 20 MG: 10 INJECTION, SOLUTION INTRAMUSCULAR; INTRAVENOUS at 09:58

## 2021-01-01 RX ADMIN — Medication 6 PUFF: at 03:35

## 2021-01-01 RX ADMIN — Medication 75 MCG/HR: at 02:15

## 2021-01-01 RX ADMIN — LEVETIRACETAM 500 MG: 5 INJECTION INTRAVENOUS at 08:30

## 2021-01-01 RX ADMIN — Medication 6 PUFF: at 03:00

## 2021-01-01 RX ADMIN — LORAZEPAM 5 MG: 2 INJECTION INTRAMUSCULAR; INTRAVENOUS at 13:34

## 2021-01-01 RX ADMIN — PROPOFOL 60 MCG/KG/MIN: 10 INJECTION, EMULSION INTRAVENOUS at 16:59

## 2021-01-01 RX ADMIN — PROPOFOL 50 MCG/KG/MIN: 10 INJECTION, EMULSION INTRAVENOUS at 02:00

## 2021-01-01 RX ADMIN — ACETAMINOPHEN 650 MG: 325 TABLET ORAL at 20:57

## 2021-01-01 RX ADMIN — LORAZEPAM 2 MG: 2 INJECTION INTRAMUSCULAR; INTRAVENOUS at 09:31

## 2021-01-01 RX ADMIN — LORAZEPAM 4 MG: 2 INJECTION INTRAMUSCULAR; INTRAVENOUS at 18:46

## 2021-01-01 RX ADMIN — MIDAZOLAM 2 MG: 1 INJECTION INTRAMUSCULAR; INTRAVENOUS at 16:47

## 2021-01-01 RX ADMIN — PROPOFOL 50 MCG/KG/MIN: 10 INJECTION, EMULSION INTRAVENOUS at 08:45

## 2021-01-01 RX ADMIN — PROPOFOL 60 MCG/KG/MIN: 10 INJECTION, EMULSION INTRAVENOUS at 06:46

## 2021-01-01 RX ADMIN — SODIUM CHLORIDE 1500 MG: 900 INJECTION INTRAVENOUS at 20:11

## 2021-01-01 RX ADMIN — ACETAMINOPHEN 650 MG: 650 SUPPOSITORY RECTAL at 08:11

## 2021-01-01 RX ADMIN — Medication 10 ML: at 09:10

## 2021-01-01 RX ADMIN — LORAZEPAM 4 MG: 2 INJECTION INTRAMUSCULAR; INTRAVENOUS at 16:47

## 2021-01-01 RX ADMIN — SODIUM CHLORIDE 1500 MG: 900 INJECTION INTRAVENOUS at 22:34

## 2021-01-01 RX ADMIN — LEVETIRACETAM 1000 MG: 10 INJECTION INTRAVENOUS at 07:30

## 2021-01-01 RX ADMIN — PROPOFOL 60 MCG/KG/MIN: 10 INJECTION, EMULSION INTRAVENOUS at 20:49

## 2021-01-01 RX ADMIN — CEFEPIME HYDROCHLORIDE 2000 MG: 2 INJECTION, POWDER, FOR SOLUTION INTRAVENOUS at 22:34

## 2021-01-01 RX ADMIN — POTASSIUM CHLORIDE 10 MEQ: 7.46 INJECTION, SOLUTION INTRAVENOUS at 00:34

## 2021-01-01 RX ADMIN — LEVETIRACETAM 1000 MG: 10 INJECTION INTRAVENOUS at 09:15

## 2021-01-01 RX ADMIN — LORAZEPAM 4 MG: 2 INJECTION INTRAMUSCULAR; INTRAVENOUS at 14:45

## 2021-01-01 RX ADMIN — Medication 6 PUFF: at 20:22

## 2021-01-01 RX ADMIN — MIDAZOLAM 2 MG: 1 INJECTION INTRAMUSCULAR; INTRAVENOUS at 20:19

## 2021-01-01 RX ADMIN — LORAZEPAM 4 MG: 2 INJECTION INTRAMUSCULAR; INTRAVENOUS at 12:05

## 2021-01-01 RX ADMIN — ACETAMINOPHEN 650 MG: 650 SUPPOSITORY RECTAL at 08:30

## 2021-01-01 RX ADMIN — Medication 6 PUFF: at 20:03

## 2021-01-01 RX ADMIN — MORPHINE SULFATE 5 MG: 4 INJECTION, SOLUTION INTRAMUSCULAR; INTRAVENOUS at 20:32

## 2021-01-01 RX ADMIN — LEVETIRACETAM 1000 MG: 10 INJECTION INTRAVENOUS at 18:00

## 2021-01-01 RX ADMIN — ENOXAPARIN SODIUM 40 MG: 40 INJECTION SUBCUTANEOUS at 08:46

## 2021-01-01 RX ADMIN — LORAZEPAM 4 MG: 2 INJECTION INTRAMUSCULAR; INTRAVENOUS at 05:29

## 2021-01-01 RX ADMIN — SODIUM CHLORIDE 1500 MG: 900 INJECTION INTRAVENOUS at 05:21

## 2021-01-01 RX ADMIN — MIDAZOLAM 2 MG: 1 INJECTION INTRAMUSCULAR; INTRAVENOUS at 11:58

## 2021-01-01 RX ADMIN — MORPHINE SULFATE 5 MG: 4 INJECTION, SOLUTION INTRAMUSCULAR; INTRAVENOUS at 16:47

## 2021-01-01 RX ADMIN — Medication 25 MCG/HR: at 00:45

## 2021-01-01 RX ADMIN — LORAZEPAM 5 MG: 2 INJECTION INTRAMUSCULAR; INTRAVENOUS at 18:21

## 2021-01-01 RX ADMIN — Medication 6 PUFF: at 04:11

## 2021-01-01 RX ADMIN — Medication 50 MCG/HR: at 02:00

## 2021-01-01 RX ADMIN — POTASSIUM CHLORIDE 10 MEQ: 7.46 INJECTION, SOLUTION INTRAVENOUS at 04:15

## 2021-01-01 RX ADMIN — Medication 50 MCG/HR: at 19:39

## 2021-01-01 RX ADMIN — PROPOFOL 50 MCG/KG/MIN: 10 INJECTION, EMULSION INTRAVENOUS at 14:18

## 2021-01-01 RX ADMIN — SODIUM CHLORIDE 1500 MG: 900 INJECTION INTRAVENOUS at 08:30

## 2021-01-01 RX ADMIN — LEVETIRACETAM 1000 MG: 10 INJECTION INTRAVENOUS at 08:45

## 2021-01-01 RX ADMIN — MORPHINE SULFATE 5 MG: 4 INJECTION, SOLUTION INTRAMUSCULAR; INTRAVENOUS at 14:46

## 2021-01-01 RX ADMIN — LEVETIRACETAM 1000 MG: 10 INJECTION INTRAVENOUS at 18:02

## 2021-01-01 RX ADMIN — PROPOFOL 50 MCG/KG/MIN: 10 INJECTION, EMULSION INTRAVENOUS at 02:10

## 2021-01-01 RX ADMIN — Medication 6 PUFF: at 12:01

## 2021-01-01 RX ADMIN — LORAZEPAM 5 MG: 2 INJECTION INTRAMUSCULAR; INTRAVENOUS at 11:36

## 2021-01-01 RX ADMIN — PROPOFOL 50 MCG/KG/MIN: 10 INJECTION, EMULSION INTRAVENOUS at 01:45

## 2021-01-01 RX ADMIN — Medication 10 ML: at 07:34

## 2021-01-01 RX ADMIN — Medication 10 ML: at 20:38

## 2021-01-01 RX ADMIN — DEXTROSE MONOHYDRATE 820 MG PE: 50 INJECTION, SOLUTION INTRAVENOUS at 13:52

## 2021-01-01 RX ADMIN — Medication 10 ML: at 20:46

## 2021-01-01 RX ADMIN — Medication 6 PUFF: at 09:18

## 2021-01-01 RX ADMIN — MIDAZOLAM 2 MG: 1 INJECTION INTRAMUSCULAR; INTRAVENOUS at 17:40

## 2021-01-01 RX ADMIN — FOSPHENYTOIN SODIUM 100 MG PE: 50 INJECTION, SOLUTION INTRAMUSCULAR; INTRAVENOUS at 13:11

## 2021-01-01 RX ADMIN — SODIUM CHLORIDE 1500 MG: 900 INJECTION INTRAVENOUS at 02:34

## 2021-01-01 RX ADMIN — SODIUM CHLORIDE 1500 MG: 900 INJECTION INTRAVENOUS at 16:36

## 2021-01-01 RX ADMIN — SODIUM CHLORIDE 1500 MG: 900 INJECTION INTRAVENOUS at 10:23

## 2021-01-01 RX ADMIN — EPINEPHRINE 2 MCG/MIN: 1 INJECTION PARENTERAL at 18:01

## 2021-01-01 RX ADMIN — LEVETIRACETAM 1000 MG: 10 INJECTION INTRAVENOUS at 20:38

## 2021-01-01 RX ADMIN — PROPOFOL 60 MCG/KG/MIN: 10 INJECTION, EMULSION INTRAVENOUS at 14:15

## 2021-01-01 RX ADMIN — FUROSEMIDE 40 MG: 10 INJECTION, SOLUTION INTRAVENOUS at 09:59

## 2021-01-01 RX ADMIN — Medication 10 ML: at 07:59

## 2021-01-01 RX ADMIN — SODIUM CHLORIDE 1500 MG: 900 INJECTION INTRAVENOUS at 07:31

## 2021-01-01 RX ADMIN — LORAZEPAM 4 MG: 2 INJECTION INTRAMUSCULAR; INTRAVENOUS at 16:25

## 2021-01-01 RX ADMIN — LORAZEPAM 4 MG: 2 INJECTION INTRAMUSCULAR; INTRAVENOUS at 02:12

## 2021-01-01 RX ADMIN — LEVETIRACETAM 1000 MG: 10 INJECTION INTRAVENOUS at 08:16

## 2021-01-01 RX ADMIN — ACETAMINOPHEN 650 MG: 650 SUPPOSITORY RECTAL at 13:52

## 2021-01-01 RX ADMIN — FOSPHENYTOIN SODIUM 100 MG PE: 50 INJECTION, SOLUTION INTRAMUSCULAR; INTRAVENOUS at 01:47

## 2021-01-01 RX ADMIN — Medication 2 MCG/MIN: at 10:46

## 2021-01-01 RX ADMIN — LORAZEPAM 4 MG: 2 INJECTION INTRAMUSCULAR; INTRAVENOUS at 20:31

## 2021-01-01 RX ADMIN — PROPOFOL 30 MCG/KG/MIN: 10 INJECTION, EMULSION INTRAVENOUS at 20:51

## 2021-01-01 RX ADMIN — LORAZEPAM 5 MG: 2 INJECTION INTRAMUSCULAR; INTRAVENOUS at 16:50

## 2021-01-01 RX ADMIN — FOSPHENYTOIN SODIUM 100 MG PE: 50 INJECTION, SOLUTION INTRAMUSCULAR; INTRAVENOUS at 13:07

## 2021-01-01 RX ADMIN — PROPOFOL 70 MCG/KG/MIN: 10 INJECTION, EMULSION INTRAVENOUS at 12:31

## 2021-01-01 RX ADMIN — Medication 50 MCG/HR: at 04:18

## 2021-01-01 RX ADMIN — POTASSIUM CHLORIDE 10 MEQ: 7.46 INJECTION, SOLUTION INTRAVENOUS at 23:25

## 2021-01-01 RX ADMIN — PROPOFOL 60 MCG/KG/MIN: 10 INJECTION, EMULSION INTRAVENOUS at 18:37

## 2021-01-01 RX ADMIN — Medication 40 MCG/MIN: at 10:00

## 2021-01-01 RX ADMIN — Medication 1000 MG: at 17:49

## 2021-01-01 RX ADMIN — PANTOPRAZOLE SODIUM 40 MG: 40 INJECTION, POWDER, FOR SOLUTION INTRAVENOUS at 10:01

## 2021-01-01 RX ADMIN — LORAZEPAM 5 MG: 2 INJECTION INTRAMUSCULAR; INTRAVENOUS at 18:02

## 2021-01-01 RX ADMIN — PROPOFOL 80 MCG/KG/MIN: 10 INJECTION, EMULSION INTRAVENOUS at 09:30

## 2021-01-01 RX ADMIN — Medication 50 MCG/HR: at 06:31

## 2021-01-01 RX ADMIN — PROPOFOL 40 MCG/KG/MIN: 10 INJECTION, EMULSION INTRAVENOUS at 20:44

## 2021-01-01 RX ADMIN — SENNOSIDES 8.6 MG: 8.6 TABLET, FILM COATED ORAL at 20:21

## 2021-01-01 RX ADMIN — PROPOFOL 40 MCG/KG/MIN: 10 INJECTION, EMULSION INTRAVENOUS at 10:02

## 2021-01-01 RX ADMIN — LORAZEPAM 5 MG: 2 INJECTION INTRAMUSCULAR; INTRAVENOUS at 01:57

## 2021-01-01 RX ADMIN — Medication 6 PUFF: at 08:56

## 2021-01-01 RX ADMIN — LORAZEPAM 4 MG: 2 INJECTION INTRAMUSCULAR; INTRAVENOUS at 22:40

## 2021-01-01 RX ADMIN — LORAZEPAM 5 MG: 2 INJECTION INTRAMUSCULAR; INTRAVENOUS at 21:29

## 2021-01-01 RX ADMIN — MIDAZOLAM 2 MG: 1 INJECTION INTRAMUSCULAR; INTRAVENOUS at 11:38

## 2021-01-01 RX ADMIN — Medication 6 PUFF: at 16:01

## 2021-01-01 RX ADMIN — PANTOPRAZOLE SODIUM 40 MG: 40 INJECTION, POWDER, FOR SOLUTION INTRAVENOUS at 08:15

## 2021-01-01 RX ADMIN — ACETAMINOPHEN 650 MG: 325 TABLET ORAL at 20:56

## 2021-01-01 RX ADMIN — PROPOFOL 60 MCG/KG/MIN: 10 INJECTION, EMULSION INTRAVENOUS at 09:59

## 2021-01-01 RX ADMIN — Medication 6 PUFF: at 16:55

## 2021-01-01 RX ADMIN — LORAZEPAM 4 MG: 2 INJECTION INTRAMUSCULAR; INTRAVENOUS at 07:59

## 2021-01-01 RX ADMIN — SODIUM CHLORIDE 1500 MG: 900 INJECTION INTRAVENOUS at 15:36

## 2021-01-01 RX ADMIN — DOCUSATE SODIUM 100 MG: 50 LIQUID ORAL at 11:41

## 2021-01-01 RX ADMIN — MORPHINE SULFATE 5 MG: 4 INJECTION, SOLUTION INTRAMUSCULAR; INTRAVENOUS at 16:05

## 2021-01-01 RX ADMIN — ENOXAPARIN SODIUM 40 MG: 40 INJECTION SUBCUTANEOUS at 09:15

## 2021-01-01 RX ADMIN — SODIUM CHLORIDE 1500 MG: 900 INJECTION INTRAVENOUS at 20:48

## 2021-01-01 RX ADMIN — PROPOFOL 25 MCG/KG/MIN: 10 INJECTION, EMULSION INTRAVENOUS at 16:20

## 2021-01-01 RX ADMIN — LORAZEPAM 5 MG: 2 INJECTION INTRAMUSCULAR; INTRAVENOUS at 14:51

## 2021-01-01 RX ADMIN — LORAZEPAM 5 MG: 2 INJECTION INTRAMUSCULAR; INTRAVENOUS at 22:54

## 2021-01-01 RX ADMIN — Medication 6 PUFF: at 00:46

## 2021-01-01 RX ADMIN — Medication 10 ML: at 20:01

## 2021-01-01 RX ADMIN — Medication 6 PUFF: at 00:07

## 2021-01-01 RX ADMIN — PANTOPRAZOLE SODIUM 40 MG: 40 INJECTION, POWDER, FOR SOLUTION INTRAVENOUS at 09:15

## 2021-01-01 RX ADMIN — SENNOSIDES 8.6 MG: 8.6 TABLET, FILM COATED ORAL at 20:44

## 2021-01-01 RX ADMIN — Medication 1000 MG: at 17:51

## 2021-01-01 RX ADMIN — SODIUM CHLORIDE 1500 MG: 900 INJECTION INTRAVENOUS at 22:19

## 2021-01-01 RX ADMIN — Medication 6 PUFF: at 23:23

## 2021-01-01 RX ADMIN — PROPOFOL 70 MCG/KG/MIN: 10 INJECTION, EMULSION INTRAVENOUS at 12:22

## 2021-01-01 RX ADMIN — PROPOFOL 50 MCG/KG/MIN: 10 INJECTION, EMULSION INTRAVENOUS at 20:17

## 2021-01-01 RX ADMIN — PROPOFOL 50 MCG/KG/MIN: 10 INJECTION, EMULSION INTRAVENOUS at 16:52

## 2021-01-01 RX ADMIN — Medication 6 PUFF: at 07:56

## 2021-01-01 RX ADMIN — PANTOPRAZOLE SODIUM 40 MG: 40 INJECTION, POWDER, FOR SOLUTION INTRAVENOUS at 08:10

## 2021-01-01 RX ADMIN — Medication 6 PUFF: at 13:23

## 2021-01-01 RX ADMIN — LEVETIRACETAM 1000 MG: 10 INJECTION INTRAVENOUS at 20:46

## 2021-01-01 RX ADMIN — MORPHINE SULFATE 10 MG: 4 INJECTION INTRAVENOUS at 10:44

## 2021-01-01 RX ADMIN — MIDAZOLAM 2 MG: 1 INJECTION INTRAMUSCULAR; INTRAVENOUS at 18:06

## 2021-01-01 RX ADMIN — Medication 6 PUFF: at 20:27

## 2021-01-01 RX ADMIN — POTASSIUM CHLORIDE 10 MEQ: 7.46 INJECTION, SOLUTION INTRAVENOUS at 02:02

## 2021-01-01 RX ADMIN — FOSPHENYTOIN SODIUM 100 MG PE: 50 INJECTION, SOLUTION INTRAMUSCULAR; INTRAVENOUS at 13:12

## 2021-01-01 RX ADMIN — LEVETIRACETAM 1000 MG: 10 INJECTION INTRAVENOUS at 08:10

## 2021-01-01 RX ADMIN — Medication 6 PUFF: at 08:22

## 2021-01-01 RX ADMIN — Medication 25 MCG/HR: at 23:56

## 2021-01-01 RX ADMIN — FOSPHENYTOIN SODIUM 100 MG PE: 50 INJECTION, SOLUTION INTRAMUSCULAR; INTRAVENOUS at 01:09

## 2021-01-01 RX ADMIN — FOSPHENYTOIN SODIUM 100 MG PE: 50 INJECTION, SOLUTION INTRAMUSCULAR; INTRAVENOUS at 14:16

## 2021-01-01 RX ADMIN — MIDAZOLAM 10 MG/HR: 5 INJECTION, SOLUTION INTRAMUSCULAR; INTRAVENOUS at 19:40

## 2021-01-01 RX ADMIN — ACETAMINOPHEN 650 MG: 325 TABLET ORAL at 20:18

## 2021-01-01 RX ADMIN — LEVETIRACETAM 1000 MG: 10 INJECTION INTRAVENOUS at 09:11

## 2021-01-01 RX ADMIN — ENOXAPARIN SODIUM 40 MG: 40 INJECTION SUBCUTANEOUS at 14:54

## 2021-01-01 RX ADMIN — ACETAMINOPHEN 650 MG: 650 SUPPOSITORY RECTAL at 20:00

## 2021-01-01 RX ADMIN — LEVETIRACETAM 1000 MG: 10 INJECTION INTRAVENOUS at 20:50

## 2021-01-01 RX ADMIN — MIDAZOLAM 1 MG/HR: 5 INJECTION, SOLUTION INTRAMUSCULAR; INTRAVENOUS at 13:49

## 2021-01-01 RX ADMIN — FOSPHENYTOIN SODIUM 100 MG PE: 50 INJECTION, SOLUTION INTRAMUSCULAR; INTRAVENOUS at 01:36

## 2021-01-01 RX ADMIN — MIDAZOLAM 2 MG: 1 INJECTION INTRAMUSCULAR; INTRAVENOUS at 16:04

## 2021-01-01 RX ADMIN — PROPOFOL 60 MCG/KG/MIN: 10 INJECTION, EMULSION INTRAVENOUS at 04:18

## 2021-01-01 RX ADMIN — FOSPHENYTOIN SODIUM 100 MG PE: 50 INJECTION, SOLUTION INTRAMUSCULAR; INTRAVENOUS at 02:08

## 2021-01-01 RX ADMIN — ATROPINE SULFATE 1 MG: 0.1 INJECTION, SOLUTION ENDOTRACHEAL; INTRAMUSCULAR; INTRAVENOUS; SUBCUTANEOUS at 17:10

## 2021-01-01 RX ADMIN — PANTOPRAZOLE SODIUM 40 MG: 40 INJECTION, POWDER, FOR SOLUTION INTRAVENOUS at 08:45

## 2021-01-01 RX ADMIN — MIDAZOLAM 8 MG/HR: 5 INJECTION, SOLUTION INTRAMUSCULAR; INTRAVENOUS at 08:22

## 2021-01-01 RX ADMIN — PROPOFOL 50 MCG/KG/MIN: 10 INJECTION, EMULSION INTRAVENOUS at 03:39

## 2021-01-01 RX ADMIN — SODIUM CHLORIDE 1500 MG: 900 INJECTION INTRAVENOUS at 20:35

## 2021-01-01 RX ADMIN — Medication 6 PUFF: at 15:54

## 2021-01-01 RX ADMIN — Medication 10 ML: at 08:31

## 2021-01-01 RX ADMIN — PROPOFOL 50 MCG/KG/MIN: 10 INJECTION, EMULSION INTRAVENOUS at 08:15

## 2021-01-01 RX ADMIN — MIDAZOLAM 10 MG/HR: 5 INJECTION, SOLUTION INTRAMUSCULAR; INTRAVENOUS at 18:23

## 2021-01-01 RX ADMIN — Medication 6 PUFF: at 11:22

## 2021-01-01 RX ADMIN — Medication 6 PUFF: at 12:33

## 2021-01-01 RX ADMIN — POTASSIUM CHLORIDE 10 MEQ: 7.46 INJECTION, SOLUTION INTRAVENOUS at 05:25

## 2021-01-01 RX ADMIN — Medication 60 MCG/MIN: at 18:02

## 2021-01-01 ASSESSMENT — PAIN SCALES - GENERAL
PAINLEVEL_OUTOF10: 0
PAINLEVEL_OUTOF10: 1
PAINLEVEL_OUTOF10: 0

## 2021-01-01 ASSESSMENT — PULMONARY FUNCTION TESTS
PIF_VALUE: 20
PIF_VALUE: 20
PIF_VALUE: 17
PIF_VALUE: 23
PIF_VALUE: 21
PIF_VALUE: 23
PIF_VALUE: 17
PIF_VALUE: 20
PIF_VALUE: 23
PIF_VALUE: 20
PIF_VALUE: 20
PIF_VALUE: 21
PIF_VALUE: 21
PIF_VALUE: 23
PIF_VALUE: 22
PIF_VALUE: 18
PIF_VALUE: 20
PIF_VALUE: 23
PIF_VALUE: 16
PIF_VALUE: 39
PIF_VALUE: 21
PIF_VALUE: 17
PIF_VALUE: 20
PIF_VALUE: 18
PIF_VALUE: 25
PIF_VALUE: 20
PIF_VALUE: 17
PIF_VALUE: 146
PIF_VALUE: 21
PIF_VALUE: 17
PIF_VALUE: 17

## 2021-02-03 NOTE — ED PROVIDER NOTES
Emergency Department Encounter    Patient: Yesica Apodaca  MRN: [id number]  : 1950  Date of Evaluation: 2021  ED Provider:  Jose Angel Quick      Triage Chief Complaint:   Cardiac Arrest (Pt found down by family and into cardiac arrest when squad arrived. Pt in vfib 2 shocks delivered and then tamponade. 300 amiodarone given and sodium bicarb. Epi x5 given by squad. Virgil device in place upon arrival, PEA)      [place]:  Yesica Apodaca is a 65 y.o. male that presents to the ER for evaluation of syncope blunt head trauma, followed by V. fib cardiac arrest with endotracheal intubation by EMS epinephrine amiodarone defibrillation intraosseous establishment and endotracheal establishment with CPR in progress on arrival.  Collateral history is obtained from EMS as well as the son. Apparently when after the patient fell the son was standing on his back to try to assist in some maneuvers to assist him, he does have significant hemoptysis and active bleeding from the endotracheal tube CPR was in progress. Additionally, as per the son as well as with cardiology present apparently he has been having epigastric discomfort and chest pain and had a negative stress test within the last 2 weeks. ROS:  Unable to fully obtain given clinical condition    Past Medical History:   Diagnosis Date    GERD (gastroesophageal reflux disease)     Hypertension     Kidney stone      No past surgical history on file. No family history on file.   Social History     Socioeconomic History    Marital status:      Spouse name: Not on file    Number of children: Not on file    Years of education: Not on file    Highest education level: Not on file   Occupational History    Not on file   Social Needs    Financial resource strain: Not on file    Food insecurity     Worry: Not on file     Inability: Not on file    Transportation needs     Medical: Not on file     Non-medical: Not on file   Tobacco Use    Smoking status: Never Smoker   Substance and Sexual Activity    Alcohol use: No    Drug use: Not on file    Sexual activity: Not on file   Lifestyle    Physical activity     Days per week: Not on file     Minutes per session: Not on file    Stress: Not on file   Relationships    Social connections     Talks on phone: Not on file     Gets together: Not on file     Attends [employer] service: Not on file     Active member of club or organization: Not on file     Attends meetings of clubs or organizations: Not on file     Relationship status: Not on file    Intimate partner violence     Fear of current or ex partner: Not on file     Emotionally abused: Not on file     Physically abused: Not on file     Forced sexual activity: Not on file   Other Topics Concern    Not on file   Social History Narrative    Not on file     Current Facility-Administered Medications   Medication Dose Route Frequency Provider Last Rate Last Admin    LORazepam (ATIVAN) injection 5 mg  5 mg Intravenous Q4H PRN Rosa Urena MD   5 mg at 02/09/21 0426    acetaminophen (TYLENOL) tablet 650 mg  650 mg Oral Q4H PRN Stephanie Rodriguez MD   650 mg at 02/09/21 0543    albuterol sulfate  (90 Base) MCG/ACT inhaler 6 puff  6 puff Inhalation Q4H Liban Zee MD   6 puff at 02/09/21 0359    norepinephrine (LEVOPHED) 16 mg in dextrose 5% 250 mL infusion  2-100 mcg/min Intravenous Continuous Liban Zee MD   Stopped at 02/08/21 1830    midazolam (VERSED) 100 mg in dextrose 5 % 100 mL infusion  1-10 mg/hr Intravenous Continuous Filippo Oropeza MD 10 mL/hr at 02/08/21 1915 10 mg/hr at 02/08/21 1915    levetiracetam (KEPPRA) 1000 mg/100 mL IVPB  1,000 mg Intravenous Q12H Liban Zee MD   Stopped at 02/08/21 2101    fosphenytoin (CEREBYX) 100 mg PE in dextrose 5 % 50 mL IVPB (maintenance dose)  2.5 mg PE/kg/day Intravenous Q12H Filippo Oropeza MD   Stopped at 02/09/21 0238    acetaminophen (TYLENOL) suppository 650 mg  650 mg Rectal Q4H PRN Micah Vu MD   650 mg at 02/07/21 0811    fentaNYL 10 mcg/mL infusion  25 mcg/hr Intravenous Continuous Jesus Crabtree MD 2.5 mL/hr at 02/09/21 0045 25 mcg/hr at 02/09/21 0045    ampicillin-sulbactam (UNASYN) 1500 mg IVPB minibag  1,500 mg Intravenous Q6H Erick Vasquez MD   Stopped at 02/09/21 0513    propofol injection  5-50 mcg/kg/min Intravenous Titrated Silviano Cote MD 60.9 mL/hr at 02/09/21 0434 60 mcg/kg/min at 02/09/21 0434    sodium chloride flush 0.9 % injection 10 mL  10 mL Intravenous 2 times per day Trian Chavez MD   10 mL at 02/08/21 2046    sodium chloride flush 0.9 % injection 10 mL  10 mL Intravenous PRN Jesus Crabtree MD        promethazine (PHENERGAN) tablet 12.5 mg  12.5 mg Oral Q6H PRN Jesus Crabtree MD        Or    ondansetron (ZOFRAN) injection 4 mg  4 mg Intravenous Q6H PRN Jesus Crabtree MD        potassium chloride 10 mEq/100 mL IVPB (Peripheral Line)  10 mEq Intravenous PRN Maryanne Crabtree  mL/hr at 02/04/21 0525 10 mEq at 02/04/21 0525    magnesium sulfate 2000 mg in 50 mL IVPB premix  2,000 mg Intravenous PRN Jesus Crabtree MD        pantoprazole (PROTONIX) injection 40 mg  40 mg Intravenous Daily Jesus Crabtree MD   40 mg at 02/08/21 0804     No Known Allergies    Nursing Notes Reviewed    Physical Exam:    General appearance:  unresponsive  Skin: Cool extremities. Dry. No signs of trauma  Eye: Pupils are fixed and dilated  Ears, nose, mouth and throat:  Oral mucosa without foreign body, endotracheal tube with active hemorrhage  Neck:  Trachea midline. Extremity:  no trauma, cool extremities  Heart: no peripheral pulse, or cardiac activity  Perfusion:  cool extremities, delayed cap refill  Respiratory:  no spontaneous respirations, equal breath sounds with bagging via ET tube  Abdominal:  Non distended.    no signs of trauma  Neurologic:  Unresponsive without response to pain in extremities    I have reviewed and interpreted all of the currently available lab results from this visit (if applicable):  Results for orders placed or performed during the hospital encounter of 02/03/21   Blood Gas, Venous   Result Value Ref Range    pH, Andrea 7.042 (LL) 7.350 - 7.450    pCO2, Andrea 85.5 (H) 40.0 - 50.0 mmHg    pO2, Andrea 65.9 (H) 25.0 - 40.0 mmHg    HCO3, Venous 23.2 23.0 - 29.0 mmol/L    Base Excess, Andrea -9.3 (L) -3.0 - 3.0 mmol/L    O2 Sat, Andrea 82 Not Established %    Carboxyhemoglobin 2.1 (H) 0.0 - 1.5 %    MetHgb, Andrea 0.3 <1.5 %    TC02 (Calc), Andrea 58 Not Established mmol/L    O2 Content, Andrea 16 Not Established VOL %    O2 Therapy Unknown     Hemoglobin, Andrea, Reduced 18 %   CBC Auto Differential   Result Value Ref Range    WBC 11.1 (H) 4.0 - 11.0 K/uL    RBC 4.85 4.20 - 5.90 M/uL    Hemoglobin 13.7 13.5 - 17.5 g/dL    Hematocrit 43.5 40.5 - 52.5 %    MCV 89.6 80.0 - 100.0 fL    MCH 28.3 26.0 - 34.0 pg    MCHC 31.6 31.0 - 36.0 g/dL    RDW 13.1 12.4 - 15.4 %    Platelets 491 399 - 987 K/uL    MPV 9.0 5.0 - 10.5 fL    Neutrophils % 49.0 %    Lymphocytes % 43.4 %    Monocytes % 4.1 %    Eosinophils % 2.6 %    Basophils % 0.9 %    Neutrophils Absolute 5.4 1.7 - 7.7 K/uL    Lymphocytes Absolute 4.8 1.0 - 5.1 K/uL    Monocytes Absolute 0.5 0.0 - 1.3 K/uL    Eosinophils Absolute 0.3 0.0 - 0.6 K/uL    Basophils Absolute 0.1 0.0 - 0.2 K/uL   Comprehensive Metabolic Panel   Result Value Ref Range    Sodium 144 136 - 145 mmol/L    Potassium 2.7 (LL) 3.5 - 5.1 mmol/L    Chloride 105 99 - 110 mmol/L    CO2 24 21 - 32 mmol/L    Anion Gap 15 3 - 16    Glucose 258 (H) 70 - 99 mg/dL    BUN 12 7 - 20 mg/dL    CREATININE 1.0 0.8 - 1.3 mg/dL    GFR Non-African American >60 >60    GFR African American >60 >60    Calcium 7.6 (L) 8.3 - 10.6 mg/dL    Total Protein 6.0 (L) 6.4 - 8.2 g/dL    Albumin 3.4 3.4 - 5.0 g/dL    Albumin/Globulin Ratio 1.3 1.1 - 2.2    Total Bilirubin <0.2 0.0 - 1.0 mg/dL    Alkaline Phosphatase 94 40 - 129 U/L     (H) 10 - 40 U/L     (H) 15 - 37 U/L    Globulin 2.6 g/dL   Protime-INR   Result Value Ref Range    Protime 13.2 10.0 - 13.2 sec    INR 1.14 0.86 - 1.14   Magnesium   Result Value Ref Range    Magnesium 2.50 (H) 1.80 - 2.40 mg/dL   Brain Natriuretic Peptide   Result Value Ref Range    Pro- (H) 0 - 124 pg/mL   Troponin   Result Value Ref Range    Troponin 0.28 (H) <0.01 ng/mL   COVID-19   Result Value Ref Range    SARS-CoV-2, NAAT Not Detected Not Detected   Blood Gas, Arterial   Result Value Ref Range    pH, Arterial 7.212 (L) 7.350 - 7.450    pCO2, Arterial 54.5 (H) 35.0 - 45.0 mmHg    pO2, Arterial 101.0 75.0 - 108.0 mmHg    HCO3, Arterial 21.9 21.0 - 29.0 mmol/L    Base Excess, Arterial -6.7 (L) -3.0 - 3.0 mmol/L    Hemoglobin, Art, Extended 15.4 13.5 - 17.5 g/dL    O2 Sat, Arterial 96.5 >92 %    Carboxyhgb, Arterial 0.5 0.0 - 1.5 %    Methemoglobin, Arterial 0.6 <1.5 %    TCO2, Arterial 52.8 Not Established mmol/L    O2 Content, Arterial 21 Not Established mL/dL    O2 Therapy Unknown    Basic Metabolic Panel w/ Reflex to MG   Result Value Ref Range    Sodium 140 136 - 145 mmol/L    Potassium reflex Magnesium 4.4 3.5 - 5.1 mmol/L    Chloride 100 99 - 110 mmol/L    CO2 19 (L) 21 - 32 mmol/L    Anion Gap 21 (H) 3 - 16    Glucose 181 (H) 70 - 99 mg/dL    BUN 23 (H) 7 - 20 mg/dL    CREATININE 2.0 (H) 0.8 - 1.3 mg/dL    GFR Non- 33 (A) >60    GFR  40 (A) >60    Calcium 8.8 8.3 - 10.6 mg/dL   CBC auto differential   Result Value Ref Range    WBC 40.5 (HH) 4.0 - 11.0 K/uL    RBC 5.62 4.20 - 5.90 M/uL    Hemoglobin 15.7 13.5 - 17.5 g/dL    Hematocrit 49.4 40.5 - 52.5 %    MCV 87.9 80.0 - 100.0 fL    MCH 27.9 26.0 - 34.0 pg    MCHC 31.8 31.0 - 36.0 g/dL    RDW 13.3 12.4 - 15.4 %    Platelets 514 202 - 951 K/uL    MPV 8.8 5.0 - 10.5 fL    PLATELET SLIDE REVIEW Adequate     SLIDE REVIEW see below     Neutrophils % 93.0 %    Lymphocytes % 2.0 %    Monocytes % 3.0 %    Eosinophils % 0.0 %    Basophils % 0.0 %    Neutrophils Absolute 38.5 (H) 1.7 - 7.7 K/uL    Lymphocytes Absolute 0.8 (L) 1.0 - 5.1 K/uL    Monocytes Absolute 1.2 0.0 - 1.3 K/uL    Eosinophils Absolute 0.0 0.0 - 0.6 K/uL    Basophils Absolute 0.0 0.0 - 0.2 K/uL    Bands Relative 2 0 - 7 %    RBC Morphology Normal    Troponin   Result Value Ref Range    Troponin 1.88 (HH) <0.01 ng/mL   Blood gas, arterial   Result Value Ref Range    pH, Arterial 7.226 (L) 7.350 - 7.450    pCO2, Arterial 43.9 35.0 - 45.0 mmHg    pO2, Arterial 70.0 (L) 75.0 - 108.0 mmHg    HCO3, Arterial 18.2 (L) 21.0 - 29.0 mmol/L    Base Excess, Arterial -9.2 (L) -3.0 - 3.0 mmol/L    Hemoglobin, Art, Extended 16.0 13.5 - 17.5 g/dL    O2 Sat, Arterial 92.8 (L) >92 %    Carboxyhgb, Arterial 0.9 0.0 - 1.5 %    Methemoglobin, Arterial 0.3 <1.5 %    TCO2, Arterial 43.8 Not Established mmol/L    O2 Content, Arterial 21 Not Established mL/dL    O2 Therapy Unknown    Vancomycin, random   Result Value Ref Range    Vancomycin Rm 10.7 ug/mL   CBC Auto Differential   Result Value Ref Range    WBC 22.9 (H) 4.0 - 11.0 K/uL    RBC 4.81 4.20 - 5.90 M/uL    Hemoglobin 13.3 (L) 13.5 - 17.5 g/dL    Hematocrit 40.9 40.5 - 52.5 %    MCV 85.2 80.0 - 100.0 fL    MCH 27.7 26.0 - 34.0 pg    MCHC 32.6 31.0 - 36.0 g/dL    RDW 13.3 12.4 - 15.4 %    Platelets 293 (L) 764 - 450 K/uL    MPV 9.7 5.0 - 10.5 fL    Neutrophils % 86.5 %    Lymphocytes % 4.8 %    Monocytes % 8.5 %    Eosinophils % 0.1 %    Basophils % 0.1 %    Neutrophils Absolute 19.8 (H) 1.7 - 7.7 K/uL    Lymphocytes Absolute 1.1 1.0 - 5.1 K/uL    Monocytes Absolute 1.9 (H) 0.0 - 1.3 K/uL    Eosinophils Absolute 0.0 0.0 - 0.6 K/uL    Basophils Absolute 0.0 0.0 - 0.2 K/uL   Basic Metabolic Panel   Result Value Ref Range    Sodium 135 (L) 136 - 145 mmol/L    Potassium 4.1 3.5 - 5.1 mmol/L    Chloride 101 99 - 110 mmol/L    CO2 20 (L) 21 - 32 mmol/L    Anion Gap 14 3 - 16    Glucose 134 (H) 70 - 99 mg/dL    BUN 30 (H) 7 - 20 mg/dL CREATININE 1.6 (H) 0.8 - 1.3 mg/dL    GFR Non- 43 (A) >60    GFR  52 (A) >60    Calcium 8.1 (L) 8.3 - 10.6 mg/dL   COVID-19   Result Value Ref Range    SARS-CoV-2 Not Detected Not detected   Blood gas, arterial   Result Value Ref Range    pH, Arterial 7.475 (H) 7.350 - 7.450    pCO2, Arterial 34.9 (L) 35.0 - 45.0 mmHg    pO2, Arterial 66.3 (L) 75.0 - 108.0 mmHg    HCO3, Arterial 25.6 21.0 - 29.0 mmol/L    Base Excess, Arterial 2.2 -3.0 - 3.0 mmol/L    Hemoglobin, Art, Extended 10.9 (L) 13.5 - 17.5 g/dL    O2 Sat, Arterial 94.8 >92 %    Carboxyhgb, Arterial 1.1 0.0 - 1.5 %    Methemoglobin, Arterial 0.4 <1.5 %    TCO2, Arterial 59.8 Not Established mmol/L    O2 Content, Arterial 14 Not Established mL/dL    O2 Therapy Unknown    Basic Metabolic Panel   Result Value Ref Range    Sodium 139 136 - 145 mmol/L    Potassium 4.1 3.5 - 5.1 mmol/L    Chloride 105 99 - 110 mmol/L    CO2 24 21 - 32 mmol/L    Anion Gap 10 3 - 16    Glucose 109 (H) 70 - 99 mg/dL    BUN 23 (H) 7 - 20 mg/dL    CREATININE 1.0 0.8 - 1.3 mg/dL    GFR Non-African American >60 >60    GFR African American >60 >60    Calcium 7.9 (L) 8.3 - 10.6 mg/dL   CBC Auto Differential   Result Value Ref Range    WBC 19.6 (H) 4.0 - 11.0 K/uL    RBC 4.20 4. 20 - 5.90 M/uL    Hemoglobin 11.8 (L) 13.5 - 17.5 g/dL    Hematocrit 36.5 (L) 40.5 - 52.5 %    MCV 87.0 80.0 - 100.0 fL    MCH 28.1 26.0 - 34.0 pg    MCHC 32.3 31.0 - 36.0 g/dL    RDW 13.3 12.4 - 15.4 %    Platelets 321 (L) 976 - 450 K/uL    MPV 9.1 5.0 - 10.5 fL    Neutrophils % 83.1 %    Lymphocytes % 6.8 %    Monocytes % 8.4 %    Eosinophils % 1.2 %    Basophils % 0.5 %    Neutrophils Absolute 16.3 (H) 1.7 - 7.7 K/uL    Lymphocytes Absolute 1.3 1.0 - 5.1 K/uL    Monocytes Absolute 1.6 (H) 0.0 - 1.3 K/uL    Eosinophils Absolute 0.2 0.0 - 0.6 K/uL    Basophils Absolute 0.1 0.0 - 0.2 K/uL   Basic Metabolic Panel   Result Value Ref Range    Sodium 138 136 - 145 mmol/L    Potassium 3.8 Value Ref Range    WBC 9.9 4.0 - 11.0 K/uL    RBC 3.70 (L) 4.20 - 5.90 M/uL    Hemoglobin 10.7 (L) 13.5 - 17.5 g/dL    Hematocrit 32.0 (L) 40.5 - 52.5 %    MCV 86.4 80.0 - 100.0 fL    MCH 29.0 26.0 - 34.0 pg    MCHC 33.5 31.0 - 36.0 g/dL    RDW 13.5 12.4 - 15.4 %    Platelets 557 231 - 402 K/uL    MPV 9.4 5.0 - 10.5 fL    Neutrophils % 85.6 %    Lymphocytes % 5.1 %    Monocytes % 8.7 %    Eosinophils % 0.3 %    Basophils % 0.3 %    Neutrophils Absolute 8.5 (H) 1.7 - 7.7 K/uL    Lymphocytes Absolute 0.5 (L) 1.0 - 5.1 K/uL    Monocytes Absolute 0.9 0.0 - 1.3 K/uL    Eosinophils Absolute 0.0 0.0 - 0.6 K/uL    Basophils Absolute 0.0 0.0 - 0.2 K/uL   Blood gas, arterial   Result Value Ref Range    pH, Arterial 7.388 7.350 - 7.450    pCO2, Arterial 46.5 (H) 35.0 - 45.0 mmHg    pO2, Arterial 91.9 75.0 - 108.0 mmHg    HCO3, Arterial 28.0 21.0 - 29.0 mmol/L    Base Excess, Arterial 2.5 -3.0 - 3.0 mmol/L    Hemoglobin, Art, Extended 10.8 (L) 13.5 - 17.5 g/dL    O2 Sat, Arterial 97.7 >92 %    Carboxyhgb, Arterial 0.9 0.0 - 1.5 %    Methemoglobin, Arterial 0.8 <1.5 %    TCO2, Arterial 66.0 Not Established mmol/L    O2 Content, Arterial 15 Not Established mL/dL    O2 Therapy Unknown    Phenytoin level, total   Result Value Ref Range    Phenytoin Lvl 3.0 (L) 10.0 - 20.0 ug/mL    Phenytoin Dose Amount Unknown    Levetiracetam level   Result Value Ref Range    Levetiracetam Lvl 29.6 6.0 - 46.0 ug/mL    KEPPRA Dose Amt Unknown    AST   Result Value Ref Range    AST 63 (H) 15 - 37 U/L   ALT   Result Value Ref Range    ALT 41 (H) 10 - 40 U/L   Basic Metabolic Panel   Result Value Ref Range    Sodium 146 (H) 136 - 145 mmol/L    Potassium 3.5 3.5 - 5.1 mmol/L    Chloride 111 (H) 99 - 110 mmol/L    CO2 28 21 - 32 mmol/L    Anion Gap 7 3 - 16    Glucose 123 (H) 70 - 99 mg/dL    BUN 22 (H) 7 - 20 mg/dL    CREATININE 0.7 (L) 0.8 - 1.3 mg/dL    GFR Non-African American >60 >60    GFR African American >60 >60    Calcium 7.8 (L) 8.3 - 10.6 mg/dL   CBC Auto Differential   Result Value Ref Range    WBC 10.5 4.0 - 11.0 K/uL    RBC 3.76 (L) 4.20 - 5.90 M/uL    Hemoglobin 10.5 (L) 13.5 - 17.5 g/dL    Hematocrit 32.5 (L) 40.5 - 52.5 %    MCV 86.4 80.0 - 100.0 fL    MCH 27.9 26.0 - 34.0 pg    MCHC 32.3 31.0 - 36.0 g/dL    RDW 13.4 12.4 - 15.4 %    Platelets 262 787 - 783 K/uL    MPV 9.4 5.0 - 10.5 fL    Neutrophils % 73.6 %    Lymphocytes % 7.4 %    Monocytes % 11.3 %    Eosinophils % 7.4 %    Basophils % 0.3 %    Neutrophils Absolute 7.8 (H) 1.7 - 7.7 K/uL    Lymphocytes Absolute 0.8 (L) 1.0 - 5.1 K/uL    Monocytes Absolute 1.2 0.0 - 1.3 K/uL    Eosinophils Absolute 0.8 (H) 0.0 - 0.6 K/uL    Basophils Absolute 0.0 0.0 - 0.2 K/uL   POCT Glucose   Result Value Ref Range    POC Glucose 138 (H) 70 - 99 mg/dl    Performed on ACCU-CHEK    POCT Glucose   Result Value Ref Range    POC Glucose 96 70 - 99 mg/dl    Performed on ACCU-[facility] [place]    POCT Glucose   Result Value Ref Range    POC Glucose 96 70 - 99 mg/dl    Performed on ACCU-[facility] [place]    POCT Glucose   Result Value Ref Range    POC Glucose 143 (H) 70 - 99 mg/dl    Performed on ACCU-CHEK    POCT Glucose   Result Value Ref Range    POC Glucose 131 (H) 70 - 99 mg/dl    Performed on ACCU-CHEK    POCT Glucose   Result Value Ref Range    POC Glucose 167 (H) 70 - 99 mg/dl    Performed on ACCU-[facility] [place]    POCT Glucose   Result Value Ref Range    POC Glucose 155 (H) 70 - 99 mg/dl    Performed on ACCU-[facility] [place]    EKG 12 Lead   Result Value Ref Range    Ventricular Rate 113 BPM    Atrial Rate 76 BPM    P-R Interval 152 ms    QRS Duration 156 ms    Q-T Interval 452 ms    QTc Calculation (Bazett) 619 ms    R Axis 269 degrees    T Axis 227 degrees    Diagnosis       Sinus rhythm with Premature supraventricular complexesRight bundle branch blockInferior infarct , age undeterminedLikely acute anterior wall MIAbnormal ECGConfirmed by HOMERO SIMPSON MD ([id number]) on 2/5/2021 3:24:45 PM   TYPE AND SCREEN   Result Value Ref Range    ABO/Rh O POS Antibody Screen NEG       Radiographs (if obtained):    [] Radiologist's Report Reviewed:  Xr Chest Portable    Result Date: 2/3/2021  EXAMINATION: ONE XRAY VIEW OF THE CHEST 2/3/2021 5:41 pm COMPARISON: 03/11/2019 HISTORY: ORDERING SYSTEM PROVIDED HISTORY: areest TECHNOLOGIST PROVIDED HISTORY: Reason for exam:->areest Reason for Exam: code stemi/cardiac arrest/cami device used Acuity: Acute Type of Exam: Initial FINDINGS: Endotracheal tube 4 cm above the bonita. Cardiomegaly. Diffuse bilateral airspace disease. Cardiomegaly with pulmonary edema     Central Line Placement Procedure Note    Indication: vascular access and centrally administered medications    Consent: Unable to be obtained due to the emergent nature of this procedure. Procedure: The patient was positioned appropriately and the skin over the right femoral vein was prepped with betadine and draped in a sterile fashion. Local anesthesia was not performed due to the emergent nature of this procedure. A large bore needle was used to identify the vein. A guide wire was then inserted into the vein through the needle. A triple lumen catheter was then inserted into the vessel over the guide wire using the Seldinger technique. All ports showed good, free flowing blood return and were flushed with saline solution. The catheter was then securely fastened to the skin with sutures and covered with a sterile dressing. A post procedure X-ray was not indicated. The patient tolerated the procedure well. Complications: None        EKG (if obtained): (All EKG's are interpreted by myself in the absence of a cardiologist)  Wide-complex interventricular conduction delay with nonspecific ST segment changes  MDM:  Patient presents in full cardiac arrest of uncertain etiology.   Prehospital interventions initiated    Endotracheal intubation was performed via EMS, triple-lumen was established patient received multiple rounds of CPR, epinephrine with a perfusing rhythm, vasopressors, including Levophed and epinephrine were titrated. The patient developed severe hemorrhage and TXA was to be administered and this may be secondary to post cardiac arrest or blunt trauma. Resolved hemorrhage and seizure and posturing, the patient will not undergo hypothermia protocol. Patient received Keppra and ketamine for antiepileptic therapy as well as propofol infusion. Likely poor outcome given and suspected anoxic injury and posturing and seizure in cardiac consultation with interventional cardiology was performed and the patient is not a candidate for acute intervention due to hemodynamic instability pulmonary hemorrhage      Total critical care time provided today was 90 minutes. This excludes seperately billable procedures and family discussion time. Critical care time provided for obtaining history, conducting a physical exam, performing and monitoring interventions, ordering, collecting and interpreting tests, and establishing medical decision-making. There was a potential for life/limb threatening pathology requiring close evaluation and intervention with concern for patient decompensation. Clinical Impression:  1. Cardiac arrest [facility])      Disposition referral (if applicable):  No follow-up provider specified. Disposition medications (if applicable):  Current Discharge Medication List          Comment: Please note this report has been produced using speech recognition software and may contain errors related to that system including errors in grammar, punctuation, and spelling, as well as words and phrases that may be inappropriate. Efforts were made to edit the dictations.       Shahida Mack MD  [id number]

## 2021-02-03 NOTE — PROGRESS NOTES
RT called to cardiac arrest. Pt intubated in route. Tube placement confirmed positive color change on co oximeter. Pt ET Tube size 7.0 at 26 cm lip. Vent settings AC/VC 23/400/80%/+10, sats mid 90's. Etco2 29. Sxt bloody red out the tube. Will continue to monitor.

## 2021-02-03 NOTE — ED NOTES
Bed: 02  Expected date:   Expected time:   Means of arrival:   Comments:  Critical Only     Ross Munguia RN  02/03/21 [id number]

## 2021-02-03 NOTE — CONSULTS
[address]  Inpatient Consult/H+P  Interventional Cardiology/Structural Heart Disease    REASON FOR CONSULT/CHIEF COMPLAINT/HPI     RFC/CC arrest   HPI Coy Avila is a 65 y. o.presented from home via squad. Per son, was talking and in normal state of health until around 4ish. Went to bathroom and son heard a \"violent crash. \"  Ran in to bathroom to find him unresponsive and had fallen. Son states he \"stood on back to move him off the wall. \"  EMS called and patient was unresponsive entire time. Upon squad arrival, patient initially had pulse and then degenerated to VFIB. Shocked twice and no pulse palpable. Automated compression device employed and brought to ER. Pulse regained in ER. Requiring multiple doses of pressor support, cxr with pulmonary edema, blood noted per ET ~100cc, seizure activity noted. CT head pending. HISTORY/ALLERGIES/ROS     Med:  has a past medical history of GERD (gastroesophageal reflux disease), Hypertension, and Kidney stone. Surg:  has no past surgical history on file. Soc:  reports that he has never smoked. He does not have any smokeless tobacco history on file. He reports that he does not drink alcohol. Fam: [unfilled]  Allerg: Patient has no known allergies. ROS:  [x]Full ROS obtained and negative except as mentioned in HPI    MEDICATIONS      Prior to Admission medications    Medication Sig Start Date End Date Taking? Authorizing Provider   triamterene-hydrochlorothiazide (MAXZIDE-25) 37.5-25 MG per tablet Take 1 tablet by mouth daily    Historical Provider, MD   metoprolol succinate (TOPROL XL) 100 MG extended release tablet Take 100 mg by mouth daily    Historical Provider, MD   tiZANidine (ZANAFLEX) 2 MG tablet Take 2 mg by mouth daily    Historical Provider, MD   tamsulosin (FLOMAX) 0.4 MG capsule Take 0.4 mg by mouth daily    Historical Provider, MD   gabapentin (NEURONTIN) 100 MG capsule Take 100 mg by mouth 3 times daily.     Historical Provider, MD lisinopril (PRINIVIL;ZESTRIL) 20 MG tablet Take 20 mg by mouth daily    Historical Provider, MD   aspirin 81 MG tablet Take 81 mg by mouth daily    Historical Provider, MD   omeprazole (PRILOSEC) 20 MG delayed release capsule Take 1 capsule by mouth daily for 30 doses 3/11/19 4/10/19  Vester Liter, APRN - CNP   vitamin B-1 (THIAMINE) 100 MG tablet Take 100 mg by mouth daily    Historical Provider, MD   NONFORMULARY Indications: HTN med    Historical Provider, MD       PHYSICAL EXAM        Vitals:    02/03/21 1728   BP:    Pulse: 120   Resp: 17   SpO2: 93%          Gen Intubated Heart Rrr, 1/6   Head NC, AT, no abnorm Abd def   Eyes def Ext Ext nl, AT, no c/c/e   Nose Nares nl, no drain, NT Pulse decr   Throat Lips, mucosa, tongue nl Skin Color/tect/turg nl, no rash/lesion   Neck S/S, TM, NT, no bruit/JVD Psych def   Lung Vbs, decr bs Lymph No cervical or ax LA   Ch Wall NT, no deform Neuro def     LABS     Relevant and available CV data reviewed    ASSESSMENT AND PLAN     *Arrest  Status Critically ill, likely terminal, concern for neuro injury  Plan Supportive care, not candidate for invasive cardiac procedures with bleeding, likely neuro injury   Discussed with son and he agrees to defer cardiac testing   Serial cardiac enzymes   CT head given fall   Echo in AM    Critical Care   Due to the high probability of clinically significant life threating deterioration of the patient's condition that required my urgent intervention, a total critical care time of >35 minutes was used. This time excludes any time that may have been spent performing procedures. This includes but not limited to vital sign monitoring, telemetry monitoring, continuous pulse oximety, IV medication, clinical response to the IV medications, documentation time , consultation time, interpretation of lab data, review of nursing notes and old record review.      All questions and concerns were addressed to the patient/family.  Alternatives to my treatment were discussed. The note was completed using EMR.  Every effort was made to ensure accuracy; however, inadvertent computerized transcription errors may be present.

## 2021-02-04 NOTE — PROGRESS NOTES
Patient still with increased respirations up to 40.  Message to Feng Pfeiffer     Response: add fentanyl

## 2021-02-04 NOTE — PROGRESS NOTES
Clinical Pharmacy Note:    Pharmacy consulted to dose Vancomycin per Dr. Tory Craig. Age: 65   Height: 5'7\"  Weight: 73 kg  Scr:1.0mg/dL  Goal Trough: 15-20 mcg/ml      Started Vancomycin 750 IV q12. Trough ordered before 4th dose (2/05/2021 @1100) with an order to hold if trough greater than 20mcg/ml. Thanks for the consult!   Gabriel Nettles, SteffiD, [facility]

## 2021-02-04 NOTE — PROGRESS NOTES
02/04/21 0755   Vent Patient Data   Plateau Pressure 28 AMG44   Static Compliance 23.5 mL/cmH2O   Dynamic Compliance 70 mL/cmH2O

## 2021-02-04 NOTE — PROGRESS NOTES
Initial shift assessment completed, see complex assessment in doc flowsheet. Patient intubated and sedated with Diprivan and Fentanyl to RASS -4. Patient pupils pin point, non reactive, negative corneal response, cough noted with no gag present. Patient does not respond to painful stimuli, but respiratory rate does increase. Core tempeture 102.4, ice packs applied. Monitor SR/ST,  Levophed and Epi gtt noted will titrate as ordered. Respirations easy with mechanical ventilation, suctioning small amounts of secretions from ETT. Son is only [place] speaking family member, family rotating visitation. Will continue to monitor for changes.

## 2021-02-04 NOTE — PROGRESS NOTES
4 Eyes Skin Assessment     NAME:  Lizet Cox  YOB: 1950  MEDICAL RECORD NUMBER:  [id number]    The patient is being assess for  Admission    I agree that 2 RN's have performed a thorough Head to Toe Skin Assessment on the patient. ALL assessment sites listed below have been assessed. Areas assessed by both nurses:    Head, Face, Ears, Shoulders, Back, Chest, Arms, Elbows, Hands, Sacrum. Buttock, Coccyx, Ischium and Legs. Feet and Heels        Does the Patient have a Wound?  No noted wound(s)       Olvin Prevention initiated:  Yes   Wound Care Orders initiated:  No    Pressure Injury (Stage 3,4, Unstageable, DTI, NWPT, and Complex wounds) if present place consult order under [de-identified] No    New and Established Ostomies if present place consult order under : No      Nurse 1 eSignature: Electronically signed by Anatoliy Stuart RN on 2/4/21 at 2:31 AM EST    **SHARE this note so that the co-signing nurse is able to place an eSignature**    Nurse 2 eSignature: Electronically signed by Lindsay Bryant RN on 2/4/21 at 5:42 AM EST

## 2021-02-04 NOTE — CONSULTS
Palliative Care:     A 68-year-old male of [place] origin who was found slumped over in the toilet by his son. The son called EMS. Apparently, there was a delay for resuscitation. EMS gave the patient two defibrillations for ventricular fibrillation along with amiodarone, epinephrine five times with the MARIANELA device in place and the patient  had endotracheal tube placed by EMS. From the ER documentation, the patient fell backwards and the son tried to help him down. There was significant hemoptysis when the patient presented with active bleeding from the CRP from the endotracheal tube. Per the ER documentation, the patient was having chest pain for the past couple of weeks with a negative stress test recently. Patient admitted 2/3/21 and Palliative consult 2/4/21.        Echocardiogram 2/4/21:    Summary  Technically difficult and limited study. Patient on a ventilator. Tachycardic. Borderline systolic function with an estimated ejection fraction of 50%. Abnormal septal wall motion. Cannot exclude additional regional changes. Indeterminate diastolic function. Mild tricuspid regurgitation with PASP of 28 mmHg. Past Medical History:   has a past medical history of GERD (gastroesophageal reflux disease), Hypertension, and Kidney stone. Past Surgical History:   has no past surgical history on file. Advance Directives:  Full Code. No ACP in place.  (8) children        Problem Severity: Pain/Other Symptoms:    Unresponsive to pain stimuli      Bed Mobility/Toileting/Transfer:   Full       Performance Status:        Palliative Performance Scale:  100% []Full Normal activity & work No evidence of disease  90%   [] Full Normal activity & work Some evidence of disease  80%   [] Full Normal activity with Effort Some evidence of disease  70%   [] Reduced Unable Normal Job/Work Significant disease Full Normal or reduced  60%   [] Ambulation reduced; Significant disease; Can't do hobbies/housework; intake normal or reduced; occasional assist; LOC full/confusion  50%   [] Mainly sit/lie; Extensive disease; Can't do any work; Considerable assist; intake normal  Or reduced; LOC full/confusion  40%   [] Mainly in bed; Extensive disease; Mainly assist; intake normal or reduced; occasional assist; LOC full/confusion  30%   [] Bed Bound; Extensive disease; Total care; intake reduced; LOC full/confusion  20%   [] Bed Bound; Extensive disease; Total care; intake minimal; Drowsy/coma  10%   [x] Bed Bound; Extensive disease; Total care; Mouth care only; Drowsy/coma    PPS 10%    Symptom Assessment: Appetite/Nausea/Bowels/Fatigue:    lbs. Stratton in place. Social History:   reports that he has never smoked. He does not have any smokeless tobacco history on file. He reports that he does not drink alcohol. Family History:  family history is not on file. Psychological/Spiritual:   . (8) children. Buddist. No english. Needs : Kamala Henry. Family Discussion:        Patient unresponsive. Intubated/Ventilator as of 2/3/21. Today day (2). Echocardiogram completed EF 50%. Patient with unstable VS and elevated temp 101.2. Prognosis poor. One son Carlos Ren) present this AM during Physician rounds. Provided overall update of current clinical picture via Dr. Rashaad Sanchez. No ACP in place. (8) children. Four are at hospital today taking turns at bedside. Dr. Rashaad Sanchez agrees to continue communication today @ [phone] with children present to discuss all options. Once family can gather all (8) and prepared to make any medical decisions regarding code status a time/date will be set. Adventist Buddist with strong marcos. No  requested at this time. Overall prognosis poor. Will continue to be supportive with family as needed.

## 2021-02-04 NOTE — PROGRESS NOTES
[facility] PROGRESS NOTE    2/4/2021 4:30 PM        Name: Herminio Curtis . Admitted: 2/3/2021  Primary Care Provider: Amrita Ambriz MD (Tel: [phone])    Brief Course:   Patient is 65years old male with past medical history significant for hypertension GERD who presented after suffering cardiac arrest, patient was found down unresponsive by his son, CPR was not initiated, on EMS arrival he was found to be in V. fib arrest, patient was defibrillated, amiodarone and epinephrine with 5 rounds of CPR, in ER he continued to DNR cardiac arrest ROSC was not achieved for prolonged period of time. He was admitted to ICU for further evaluation is currently intubated and sedated    CC: Status post cardiac arrest    Subjective:  .   Intubated and sedated  No acute event overnight  Status post V. fib arrest    Reviewed interval ancillary notes    Current Medications      fentaNYL 10 mcg/mL infusion, Continuous      ampicillin-sulbactam (UNASYN) 1500 mg IVPB minibag, Q6H      enoxaparin (LOVENOX) injection 40 mg, Daily      propofol injection, Titrated      EPINEPHrine (EPINEPHrine HCL) 5 mg in dextrose 5 % 250 mL infusion, Continuous      norepinephrine (LEVOPHED) 16 mg in dextrose 5% 250 mL infusion, Continuous      sodium chloride flush 0.9 % injection 10 mL, 2 times per day      sodium chloride flush 0.9 % injection 10 mL, PRN      promethazine (PHENERGAN) tablet 12.5 mg, Q6H PRN    Or      ondansetron (ZOFRAN) injection 4 mg, Q6H PRN      potassium chloride 10 mEq/100 mL IVPB (Peripheral Line), PRN      magnesium sulfate 2000 mg in 50 mL IVPB premix, PRN      pantoprazole (PROTONIX) injection 40 mg, Daily        Objective:  /87   Pulse 95   Temp 102.2 °F (39 °C) (Core)   Resp 27   Ht 5' 7\" (1.702 m)   Wt 182 lb 5.1 oz (82.7 kg)   SpO2 96%   BMI 28.56 kg/m²     Intake/Output Summary (Last 24 hours) at 2/4/2021 1630  Last data filed at 2/4/2021 1516  Gross per 24 hour   Intake 2574.21 ml   Output 350 ml   Net 2224.21 ml      Wt Readings from Last 3 Encounters:   02/04/21 182 lb 5.1 oz (82.7 kg)   03/11/19 176 lb 4 oz (79.9 kg)   09/19/16 175 lb (79.4 kg)       General appearance:  Appears comfortable  Eyes: Sclera clear. Pupils equal.  ENT: Moist oral mucosa. Trachea midline, no adenopathy. Cardiovascular: Regular rhythm, normal S1, S2. No murmur. No edema in lower extremities  Respiratory: Not using accessory muscles. Good inspiratory effort. Clear to auscultation bilaterally, no wheeze or crackles. GI: Abdomen soft, no tenderness, not distended, normal bowel sounds  Musculoskeletal: No cyanosis in digits, neck supple  Neurology: CN 2-12 grossly intact. No speech or motor deficits  Psych: Normal affect. Alert and oriented in time, place and person  Skin: Warm, dry, normal turgor  Extremity exam shows brisk capillary refill. Peripheral pulses are palpable in lower extremities     Labs and Tests:  CBC:   Recent Labs     02/03/21 1718 02/04/21  0503   WBC 11.1* 40.5*   HGB 13.7 15.7    225     BMP:    Recent Labs     02/03/21  1718 02/04/21  0502    140   K 2.7* 4.4    100   CO2 24 19*   BUN 12 23*   CREATININE 1.0 2.0*   GLUCOSE 258* 181*     Hepatic:   Recent Labs     02/03/21 1718   *   *   BILITOT <0.2   ALKPHOS 94     CT CHEST WO CONTRAST   Final Result   Cervical: Degenerative disc disease. Trace anterolisthesis of T1 on T2. No   gross fracture. Chest: Diffuse bilateral heterogeneous opacity, greatest within the lower   lobes, for which leading considerations include pulmonary edema, pneumonia,   contusion, or hemorrhage. Multiple bilateral anterior rib fractures as discussed above. CT CERVICAL SPINE WO CONTRAST   Final Result   Cervical: Degenerative disc disease. Trace anterolisthesis of T1 on T2. No   gross fracture.       Chest: Diffuse bilateral heterogeneous opacity, greatest within the lower   lobes, for which leading considerations include pulmonary edema, pneumonia,   contusion, or hemorrhage. Multiple bilateral anterior rib fractures as discussed above. CT HEAD WO CONTRAST   Final Result   Motion limited exam demonstrating atrophy and small vessel ischemic disease. Moderate paranasal sinus disease. XR CHEST PORTABLE   Final Result   Cardiomegaly with pulmonary edema             Discussed with ICU nurse at bedside    Problem List  Active Problems:    Hypoxic ischemic encephalopathy  Resolved Problems:    * No resolved hospital problems. *       Assessment & Plan:     Status post V. fib arrest  Acute hypoxic respiratory failure  Aspiration pneumonia  Acute kidney injury  Cardiogenic shock    Continue ICU care  Critical care on board  Critical care obtaining EEG if no improvement by tomorrow  Obtain COVID-19 PCR testing    Echo today shows EF of 50%  No invasive cardiac procedure at this time  Continue supportive management  Serial cardiac enzyme  Echo noted as above    He was started on vancomycin and cefepime  Antibiotic was discontinued by [facility], started on Unasyn for aspiration pneumonia    Overall prognosis poor  Is currently full code    IV Access: Central line  Stratton: Yes  Diet: Diet NPO Effective Now  Code:Full Code  DVT PPX Lovenox  Disposition continue ICU care    Due to the immediate potential for life-threatening deterioration due to status post cardiac arrest, I spent 35 minutes providing critical care. This time is excluding time spent performing procedures.       Racheal Hernandez MD   2/4/2021 4:30 PM

## 2021-02-04 NOTE — PROGRESS NOTES
Comprehensive Nutrition Assessment    Type and Reason for Visit:  Initial(RD triggered d/t pt on vent)    Nutrition Recommendations/Plan:   Pt is not appropriate for EN support at this time d/t hemodynamic status. RD will continue to monitor and provide recommendations as needed/appropriate. Nutrition Assessment:  Pt is nutritionally compromised as evidenced by NPO on mechanical ventilation. Propofol infusing at 17.5 mL per hour to provide 462 calories from fat daily. Pt requiring levophed at 30 mcg/min. Concern for anoxic injury given prolonged downtime after cardiac arrest. Poor prognosis noted. Pt not appropriate for EN support at this time d/t hemodynamic status. Will continue to monitor and provide recommendations as appropriate based on plan of care. Malnutrition Assessment:  Malnutrition Status:  No malnutrition      Estimated Daily Nutrient Needs:  Energy (kcal):  1618-5079; Weight Used for Energy Requirements:  Current(83 kg)     Protein (g):  100-166 grams; Weight Used for Protein Requirements:  Current(83 kg; 1.2-2 grams per kg)        Fluid (ml/day):   ; Method Used for Fluid Requirements:  1 ml/kcal      Nutrition Related Findings:  No edema noted      Wounds:  None       Current Nutrition Therapies:    Diet NPO Effective Now    Anthropometric Measures:  · Height: 5' 7\" (170.2 cm)  · Current Body Weight: 182 lb (82.6 kg)   · Admission Body Weight: 182 lb (82.6 kg)      · Ideal Body Weight: 148 lbs   · BMI: 28.5  · BMI Categories: Overweight (BMI 25.0-29. 9)       Nutrition Diagnosis:   · Inadequate oral intake related to impaired respiratory function as evidenced by intubation      Nutrition Interventions:   Food and/or Nutrient Delivery:  Continue NPO  Nutrition Education/Counseling:  Education not indicated   Coordination of Nutrition Care:  Continue to monitor while inpatient    Goals:  Nutrition as appropriate with plan of care       Nutrition Monitoring and Evaluation: Behavioral-Environmental Outcomes:  None Identified   Food/Nutrient Intake Outcomes:     Physical Signs/Symptoms Outcomes:  Hemodynamic Status     Discharge Planning:     Too soon to determine     Electronically signed by Irma Shirley RD, LD on 2/4/21 at 1:23 PM EST    Contact: [phone]

## 2021-02-04 NOTE — PROGRESS NOTES
H/p dictation id [id number]. Date of service 02/03/2021. Vfib arrest.  Hypoxic brain injury. Myoclonus. Alveolar hemorrhage.

## 2021-02-04 NOTE — PLAN OF CARE
Nutrition Problem #1: Inadequate oral intake  Intervention: Food and/or Nutrient Delivery: Continue NPO  Nutritional Goals: Nutrition as appropriate with plan of care

## 2021-02-04 NOTE — PROGRESS NOTES
Diprivan and Fentanyl stopped as ordered by critical care physician. With in minutes patient's respiratory rate went into the 40's. Sedation restarted, will continue to attempt to titrate down, T/O my shift.

## 2021-02-04 NOTE — ED NOTES
Report called to ICU, RN verbalized understanding and denied any need for further information, patient placed on lifepak and transported to unit, levo, epi, and propofol infusing at time of transport     Bethanie Solano RN  02/03/21 2124

## 2021-02-04 NOTE — H&P
[place] [address]                     [facility], [address]                              HISTORY AND PHYSICAL    PATIENT NAME: Negin Lopez                     :        1950  MED REC NO:   [id number]                          ROOM:       9075  ACCOUNT NO:   [de-identified]                           ADMIT DATE: 2021  PROVIDER:     Davina Ley MD    DATE OF SERVICE:  I obtained the history and performed physical exam on  the patient in the emergency room on 2021. CHIEF COMPLAINT:  Cardiac arrest.    HISTORY OF PRESENT ILLNESS:  A 75-year-old male of [place] origin who was  found slumped over in the toilet by his son. The son called EMS. Apparently, there was a delay for resuscitation. EMS gave the patient  two defibrillations for ventricular fibrillation along with amiodarone,  epinephrine five times with the MARIANELA device in place and the patient  had endotracheal tube placed by EMS. From the ER documentation, the  patient fell backwards and the son tried to help him down. There was  significant hemoptysis when the patient presented with active bleeding  from the CRP from the endotracheal tube. Per the ER documentation, the  patient was having chest pain and _____ discomfort for the past couple  of weeks with a negative stress test recently. PAST MEDICAL/PAST SURGICAL HISTORY:  1. Hypertension. 2.  GERD. FAMILY HISTORY:  Reviewed by me and is currently noncontributory. SOCIAL HISTORY:  Lives at home. Nonsmoker. No illicit substance use. MEDICATIONS:  The patient's home medication list was reviewed. REVIEW OF SYSTEMS:  Unobtainable from the patient because of the  patient's mentation. PHYSICAL EXAMINATION:  VITAL SIGNS:  Temperature 96.8, respiratory rate is around 40, pulse  104, blood pressure 154/99, saturating 97%. CNS:  The patient is not responsive. PSYCH:  The patient is not agitated, but is tachypneic. EYES:  Pupils are bilaterally fixed and not reactive to light. ENT:  Endotracheal tube in place with blood in the endotracheal tube. RESPIRATORY SYSTEM:  Basilar rales. CVS:  Regular rate and rhythm. ABDOMEN:  Nondistended. MUSCULOSKELETAL:  No acute deformities. SKIN:  Without rashes or lesions. Appears extremely pale with capillary  refill of over 4 seconds. DIAGNOSTIC DATA:  White count 11.1. INR 1.14. Chest x-ray shows  cardiomegaly. COVID test negative. Potassium 2.7, sodium 144, BUN 12,  creatinine 1.0. Venous blood gas showed a pH of 7.04 with a pCO2 of  54.5. CT head showed no acute parenchymal disease. Type and screen O  positive. CT C-spine without contrast showed diffuse bilateral  heterogenous opacity with possibility of pulmonary edema, pneumonia,  contusion or hemorrhage with multiple bilateral anterior rib fractures. CT chest without contrast as noted above. CONSULTATIONS:  Cardiology and Pulmonology. Plan of care was personally  discussed by me with Dr. Tri Osborn from Cardiology over the phone. Based  on the patient's presentation with hemoptysis, appearance on the CT  chest to suggest possibility of alveolar hemorrhage and _____ seizures  at presentation, it was determined that hypothermia would not be a wide  option given more increased risk/likelihood of bleeding and hence a  joint decision was made by us to not institute the hypothermia protocol. ASSESSMENT:  1. Cardiac arrest with ventricular fibrillation. 2.  Hypoxic ischemic encephalopathy with seizure activity. 3.  Alveolar hemorrhage. PLAN OF CARE:  The patient is currently critically ill. Admitted to  intensive care unit. Requiring pressor support. Broad-spectrum  antibiotics initiated. Propofol, fentanyl and Levophed initiated. Epinephrine also initiated. Cardiology consult requested and the 2D  echo of the heart has been requested.   Serial troponins have also been ordered. Further management will be per specialist recommendations. TOTAL CRITICAL CARE TIME:  35 minutes. EXPECTED LENGTH OF STAY:  More than two midnights. PROGNOSIS:  Very poor. RISK:  Very high due to the patient's presentation with ventricular  fibrillation arrest with prolonged resuscitation and prolonged time to  resuscitation with evidence of what appears to be hypoxic myoclonic  seizures due to hypoxic ischemic encephalopathy.         Padma Robles MD    D: 02/04/2021 4:54:36       T: 02/04/2021 6:48:47     [email or web address]  Job#: [id number]     Doc#: [id number]    CC:

## 2021-02-04 NOTE — CONSULTS
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 male with past medical history significant for hypertension GERD who presented after suffering cardiac arrest.  Patient was found slumped over and unresponsive in the toilet by his son. CPR was not initiated. When EMS arrived, patient was found to be V. fib and he was given defibrillations along with amiodarone and epinephrine with 5 rounds along with CPR. When patient arrived to ER, he continued to be in cardiac arrest.  ROSC was not achieved for prolonged period of time. Per documentation, patient was having chest pain for the past couple of weeks and had a negative stress test performed outpatient recently. Patient was intubated by EMS. He did not undergo hypothermia protocol as he was noted to have bleeding from the endotracheal tube which has now resolved. Chest x-ray showed azeem pulmonary edema. Patient had to be sedated with propofol and fentanyl as without sedation, patient was very tachypneic and was fighting the ventilator. He was also noted to have acute kidney injury along with metabolic acidosis. Positive troponin. Also noted to have white cell count of 40,000. Concern for possible aspiration. He was also started on low-dose epinephrine at 2 mcg/min. 14 point ROS could not be obtained due to patient factors. Past Medical History:   Diagnosis Date    GERD (gastroesophageal reflux disease)     Hypertension     Kidney stone      No past surgical history on file.   Social History     Socioeconomic History    Marital status:      Spouse name: Not on file    Number of children: Not on file    Years of education: Not on file    Highest education shifts: In: 1881.2 [I.V.:922.9; IV Piggyback:958.3]  Out: 150 [Urine:150] No intake/output data recorded. Intake/Output Summary (Last 24 hours) at 2/4/2021 1258  Last data filed at 2/4/2021 0600  Gross per 24 hour   Intake 1881.18 ml   Output 150 ml   Net 1731.18 ml       Constitutional: He is a 65y.o.-year-old who appears his stated age. He is in no acute distress. HEENT: atraumatic, normocephalic, pupils are pinpoint and not reactive  Neck: Supple, no JVD, trachea midline  Cardiovascular: S1-S2, no murmurs. Respiratory: Tachypnea without sedation. No wheezing, no crackles  Gastrointestinal: Soft, nontender, bowel sounds present  Genitourinary: Deferred. Musculoskeletal: No edema of lower extremities, no cyanosis, no clubbing  Skin: No rash, no jaundice  Psychiatric evaluation: Could not be performed  Hematological/Lymphatic: No cervical or inguinal lymphadenopathy  Neurological: Patient was given sedation window. Off sedation, patient did not follow any commands. No response to any painful stimulus. Minimal cough.     LABS:    Recent Labs     02/03/21  1718 02/04/21  0503   WBC 11.1* 40.5*   RBC 4.85 5.62   HGB 13.7 15.7   HCT 43.5 49.4   MCH 28.3 27.9   MCHC 31.6 31.8   RDW 13.1 13.3    225   MPV 9.0 8.8   NEUTOPHILPCT 49.0 93.0   MONOPCT 4.1 3.0   BASOPCT 0.9 0.0     Recent Labs     02/03/21  1718 02/04/21  0502    140   K 2.7* 4.4    100   ANIONGAP 15 21*   CO2 24 19*   BUN 12 23*   CREATININE 1.0 2.0*   CALCIUM 7.6* 8.8   PROT 6.0*  --    BILITOT <0.2  --    ALKPHOS 94  --    *  --    *  --    GLUCOSE 258* 181*     Recent Labs     02/03/21  1758 02/04/21  0550   PHART 7.212* 7.226*   BQR3MAO 54.5* 43.9   PO2ART 101.0 70.0*   LGO1TQO 21.9 18.2*   [id number] 96.5 92.8*   BEART -6.7* -9.2*     Recent Labs     02/03/21  1718   INR 1.14       Recent Labs     02/03/21 1718 02/04/21  0503   TROPONINI 0.28* 1.88*       IMAGING:  I reviewed the chest x-ray and my interpretation is as follows. Bilateral pulmonary edema. I reviewed the CT chest and my interpretation is as follows. Bilateral pulmonary edema. Dense infiltrates in bilateral lower lobes, concern for aspiration. IMPRESSION:  Cardiopulmonary arrest   Acute hypercapnic/hypoxic respiratory failure  Acute kidney injury  Aspiration pneumonia  Shock, likely cardiogenic      PLAN:  Patient had prolonged downtime after cardiac arrest.  Concern for possible anoxic injury. Initial CT head showed atrophy and small vessel ischemic disease. If no improvement in neurological status by tomorrow, then I will obtain EEG. Currently he is sedated with propofol and fentanyl. When sedation is lifted, patient becomes very tachypneic on the ventilator. Plan will be to titrate fentanyl to off. Patient will remain on propofol for now. V. fib arrest.  Positive troponin. Patient is requiring Levophed as well as epinephrine. Titrate for mean arterial pressure of more than 65 mmHg. Echocardiogram from today showed EF of 50%. Abnormal septal wall motion. Positive troponins. No invasive cardiac procedures because of poor neurological prognostication per cardiology. Remains on the ventilator for acute hypoxic and hypercapnic respiratory failure. ABG shows mild improvement in acidosis. Continue current mechanical ventilation settings. Titrate FiO2 for saturation of 90 to 95%. Chest x-ray suggestive of pulmonary edema. There is also concern for possible aspiration in the lower lobes on CT chest.    Acute kidney injury. Creatinine has doubled. Monitor urine output. Switch antibiotics to cover for aspiration pneumonia. Discontinue vancomycin and cefepime. Start Unasyn. Follow cultures. Pepcid for stress ulcer prophylaxis. Start Lovenox prophylaxis dose if no bleeding noted from endotracheal tube. Poor prognosis. Concern for anoxic encephalopathy. Patient's son was updated at bedside.       Critical Care Time: 39 Minutes  Total critical care time caring for this patient with life threatening illness, including direct patient contact, management of life support systems, review of data including imaging and labs, discussions with other team members and physicians excluding procedures. Electronically signed by Jomar lAvarado MD on 2/4/21 at 12:58 PM EST     This note was completed using dragon medical speech recognition software. Grammatical errors, random word insertions, pronoun errors and incomplete sentences are occasional consequences of this technology due to software limitations. If there are questions or concerns about the content of this note of information contained within the body of this dictation, they should be addressed with the provider for clarification.

## 2021-02-05 NOTE — PROGRESS NOTES
PULMONARY AND CRITICAL CARE MEDICINE PROGRESS NOTE    Subjective: Patient is seen at bedside. He remains on fentanyl and propofol. Continues to have low-grade fevers. Off vasopressors. Creatinine has improved. Urine output is adequate. Improving leukocytosis. Concern for possible seizures for which she was started on Keppra. ROS could not be obtained due to patient factors. MEDICATIONS:     Scheduled Meds:   levetiracetam  500 mg Intravenous Q12H    ampicillin-sulbactam  1,500 mg Intravenous Q6H    sodium chloride flush  10 mL Intravenous 2 times per day    pantoprazole  40 mg Intravenous Daily       Current Infusions:    fentaNYL 25 mcg/hr (02/04/21 2030)    propofol 50 mcg/kg/min (02/05/21 1418)       PRN meds:  acetaminophen, sodium chloride flush, promethazine **OR** ondansetron, potassium chloride, magnesium sulfate    PHYSICAL EXAM:  BP (!) 160/92   Pulse 128   Temp 101.4 °F (38.6 °C) (Core)   Resp (!) 33   Ht 5' 7\" (1.702 m)   Wt 181 lb 14.1 oz (82.5 kg)   SpO2 (!) 87%   BMI 28.49 kg/m²  I/O last 3 completed shifts: In: 1574.3 [I.V.:1119.3; IV Piggyback:455]  Out: 0054 [Urine:1525] No intake/output data recorded. Intake/Output Summary (Last 24 hours) at 2/5/2021 1513  Last data filed at 2/5/2021 1413  Gross per 24 hour   Intake 1574.29 ml   Output 1525 ml   Net 49.29 ml       CONSTITUTIONAL: He is a 65y.o.-year-old who appears his stated age. He is in no acute distress. CARDIOVASCULAR: S1 S2 RRR. Without murmer  RESPIRATORY & CHEST: Lungs are clear to auscultation and percussion. No wheezing, no crackles. Good air movement  GASTROINTESTINAL & ABDOMEN: Soft, nontender, positive bowel sounds in all quadrants, non-distended, without hepatosplenomegaly. GENITOURINARY: Deferred. MUSCULOSKELETAL: No tenderness to palpation of the axial skeleton. There is no clubbing. No cyanosis. No edema of the lower extremities. SKIN OF BODY: No rash or jaundice.    PSYCHIATRIC EVALUATION: Normal affect. Patient answers questions appropriately. HEMATOLOGIC/LYMPHATIC/ IMMUNOLOGIC: No palpable lymphadenopathy. NEUROLOGIC: Unresponsive. Does not follow commands. No response to any painful stimulus. Off sedation, patient start having jerking movements of his left upper extremity. No corneals. Pupils are minimally reactive. LABS:    Recent Labs     02/03/21 1718 02/04/21  0503 02/05/21  0451   WBC 11.1* 40.5* 22.9*   RBC 4.85 5.62 4.81   HGB 13.7 15.7 13.3*   HCT 43.5 49.4 40.9   MCH 28.3 27.9 27.7   MCHC 31.6 31.8 32.6   RDW 13.1 13.3 13.3    225 127*   MPV 9.0 8.8 9.7   NEUTOPHILPCT 49.0 93.0 86.5   MONOPCT 4.1 3.0 8.5   BASOPCT 0.9 0.0 0.1     Recent Labs     02/03/21 1718 02/04/21  0502 02/05/21  0451    140 135*   K 2.7* 4.4 4.1    100 101   ANIONGAP 15 21* 14   CO2 24 19* 20*   BUN 12 23* 30*   CREATININE 1.0 2.0* 1.6*   CALCIUM 7.6* 8.8 8.1*   PROT 6.0*  --   --    BILITOT <0.2  --   --    ALKPHOS 94  --   --    *  --   --    *  --   --    GLUCOSE 258* 181* 134*     Recent Labs     02/03/21 1758 02/04/21  0550   PHART 7.212* 7.226*   PQY9IQE 54.5* 43.9   PO2ART 101.0 70.0*   LGW7JKQ 21.9 18.2*   Z8ZPTGOO 96.5 92.8*   BEART -6.7* -9.2*          IMAGING:  I reviewed the chest x-ray and my interpretation is as follows. Bilateral pulmonary edema. I reviewed the CT chest and my interpretation is as follows. Bilateral pulmonary edema. Dense infiltrates in bilateral lower lobes, concern for aspiration.        IMPRESSION:  Cardiopulmonary arrest   Acute hypercapnic/hypoxic respiratory failure  Acute kidney injury  Aspiration pneumonia  Shock, likely cardiogenic        PLAN:  Patient had prolonged downtime after cardiac arrest.  Concern for possible anoxic injury. Initial CT head showed atrophy and small vessel ischemic disease. For possible seizures involving left upper extremity when sedation is lifted. EEG pending.   Patient is loaded with Keppra and Keppra will be continued at 500 mg IV every 12. Neurology has been consulted. Try to wean fentanyl to off. Propofol for patient's comfort and seizure control.      V. fib arrest.  Positive troponin. Patient is requiring Levophed as well as epinephrine. Titrate for mean arterial pressure of more than 65 mmHg. Echocardiogram from today showed EF of 50%. Abnormal septal wall motion. Positive troponins. No invasive cardiac procedures because of poor neurological prognostication per cardiology.     Remains on the ventilator for acute hypoxic and hypercapnic respiratory failure. ABG shows improvement in acidosis. Continue current mechanical ventilation settings. Titrate FiO2 for saturation of 90 to 95%. Chest x-ray suggestive of pulmonary edema. There is also concern for possible aspiration in the lower lobes on CT chest.     Acute kidney injury. Creatinine is improving. Monitor urine output.     Continue Unasyn to cover for aspiration pneumonia. Leukocytosis is improving.     Pepcid for stress ulcer prophylaxis. Start Lovenox prophylaxis dose if no bleeding noted from endotracheal tube.     Poor prognosis. Concern for anoxic encephalopathy.         Critical Care Time: 45 Minutes  Total critical care time caring for this patient with life threatening illness, including direct patient contact, management of life support systems, review of data including imaging and labs, discussions with other team members and physicians excluding procedures. Erick Vasquez MD  Pulmonary Critical Care and Sleep Medicine  2/5/2021, 3:13 PM    This note was completed using dragon medical speech recognition software. Grammatical errors, random word insertions, pronoun errors and incomplete sentences are occasional consequences of this technology due to software limitations.  If there are questions or concerns about the content of this note of information contained within the body of this dictation they should be addressed with the provider for clarification.

## 2021-02-05 NOTE — PROGRESS NOTES
Intake/Output Summary (Last 24 hours) at 2/5/2021 1446  Last data filed at 2/5/2021 1400  Gross per 24 hour   Intake 1574.29 ml   Output 1495 ml   Net 79.29 ml      Wt Readings from Last 3 Encounters:   02/05/21 181 lb 14.1 oz (82.5 kg)   03/11/19 176 lb 4 oz (79.9 kg)   09/19/16 175 lb (79.4 kg)       General appearance:  Appears comfortable  Eyes: Sclera clear. Pupils equal.  ENT: Moist oral mucosa. Trachea midline, no adenopathy. Cardiovascular: Regular rhythm, normal S1, S2. No murmur. No edema in lower extremities  Respiratory: Not using accessory muscles. Good inspiratory effort. Clear to auscultation bilaterally, no wheeze or crackles. GI: Abdomen soft, no tenderness, not distended, normal bowel sounds  Musculoskeletal: No cyanosis in digits, neck supple  Neurology: Intubated  Psych: Intubated sedated  Skin: Warm, dry, normal turgor  Extremity exam shows brisk capillary refill. Peripheral pulses are palpable in lower extremities     Labs and Tests:  CBC:   Recent Labs     02/03/21 1718 02/04/21  0503 02/05/21  0451   WBC 11.1* 40.5* 22.9*   HGB 13.7 15.7 13.3*    225 127*     BMP:    Recent Labs     02/03/21  1718 02/04/21  0502 02/05/21  0451    140 135*   K 2.7* 4.4 4.1    100 101   CO2 24 19* 20*   BUN 12 23* 30*   CREATININE 1.0 2.0* 1.6*   GLUCOSE 258* 181* 134*     Hepatic:   Recent Labs     02/03/21 1718   *   *   BILITOT <0.2   ALKPHOS 94     XR CHEST PORTABLE   Final Result   Slightly decreased right-sided pulmonary disease but increased left-sided   pulmonary disease with new consolidative disease in the left lung base and   possible new left pleural effusion         CT CHEST WO CONTRAST   Final Result   Cervical: Degenerative disc disease. Trace anterolisthesis of T1 on T2. No   gross fracture.       Chest: Diffuse bilateral heterogeneous opacity, greatest within the lower   lobes, for which leading considerations include pulmonary edema, pneumonia, contusion, or hemorrhage. Multiple bilateral anterior rib fractures as discussed above. CT CERVICAL SPINE WO CONTRAST   Final Result   Cervical: Degenerative disc disease. Trace anterolisthesis of T1 on T2. No   gross fracture. Chest: Diffuse bilateral heterogeneous opacity, greatest within the lower   lobes, for which leading considerations include pulmonary edema, pneumonia,   contusion, or hemorrhage. Multiple bilateral anterior rib fractures as discussed above. CT HEAD WO CONTRAST   Final Result   Motion limited exam demonstrating atrophy and small vessel ischemic disease. Moderate paranasal sinus disease. XR CHEST PORTABLE   Final Result   Cardiomegaly with pulmonary edema             Discussed with ICU nurse at bedside    Problem List  Active Problems:    Hypoxic ischemic encephalopathy    New onset seizure ([address]    Acute encephalopathy    Acute respiratory failure with hypoxia (HCC)  Resolved Problems:    * No resolved hospital problems.  *       Assessment & Plan:     Status post V. fib arrest  Acute hypoxic respiratory failure  Seizure  Aspiration pneumonia  Acute kidney injury  Cardiogenic shock    Continue ICU care  Critical care on board  Critical care obtaining EEG if no improvement by tomorrow  Covid testing negative  Had seizure today, started on Keppra also neurology is following now  Had Ativan, will keep as needed    Echo shows abnormal septal wall motion cannot exclude additional regional changes  Borderline systolic function with EF 50%  No invasive cardiac procedure at this time  Continue supportive management  Serial cardiac enzyme  Echo noted as above    He was started on vancomycin and cefepime  Antibiotic was discontinued by [facility], started on Unasyn for aspiration pneumonia    Overall prognosis poor, I spoke today with patient's son at bedside  He understand the poor prognosis, he will talk to the rest of the family in regards of CODE STATUS, currently we will keep patient full code. IV Access: Central line  [place]: Yes  Diet: Diet NPO Effective Now  Code:Full Code  DVT PPX we will hold today, noted bleeding again from ET tube. SCD  Disposition continue ICU care    Due to the immediate potential for life-threatening deterioration due to status post cardiac arrest, I spent 35 minutes providing critical care. This time is excluding time spent performing procedures.       Alisa Freeman MD   2/5/2021 2:46 PM

## 2021-02-05 NOTE — PROGRESS NOTES
Clinical Pharmacy Note     Lovenox placed on hold by Dr. Dae Nieves. Order discontinued per protocol. Please assess and reorder when appropriate. Thank you for allowing us to participate in the care of this patient.      Jimmy Khan, PharmD, [address] [facility]   [id number]

## 2021-02-05 NOTE — CONSULTS
In patient Neurology consult        [facility] Neurology      MD Sugar Hollins  1950    Date of Service: 2/5/2021    Referring Physician: Clarissa Hanna MD      Reason for the consult and CC: Anoxic brain injury, cardiac arrest and possible seizure. HPI:   Most of the history was obtained from chart reviewing and discussion with the patient's nurse as the patient is currently intubated and sedated. The patient is a 65y.o. years old male with history of hypertension who was admitted yesterday from the ER with acute cardiac arrest and unresponsiveness. He was found unresponsive by his son on the toilet. Degree was severe. Unclear duration but could be several minutes. No other relieving or aggravating factors. No witnessed seizure. When EMT arrived he was in V. fib. Patient was transferred to the ER where he was eventually intubated and admitted to the ICU. Patient is currently intubated and sedated. Off sedation he was observed to have some left-sided muscle jerking concerning for focal seizures. He was given 500 of Keppra. He does have poor stem function. He is on sedation right now. Blood test reviewed and showed sodium 135 and creatinine of 1.6. White count is 22 and initial CT head from 2 days ago showed no acute findings. Other review of system was limited    Family history: Noncontributory  Past Medical History:   Diagnosis Date    GERD (gastroesophageal reflux disease)     Hypertension     Kidney stone      No past surgical history on file.   Social History     Tobacco Use    Smoking status: Never Smoker   Substance Use Topics    Alcohol use: No    Drug use: Not on file     No Known Allergies  Current Facility-Administered Medications   Medication Dose Route Frequency Provider Last Rate Last Admin    levetiracetam (KEPPRA) 500 mg/100 mL IVPB  500 mg Intravenous Q12H Clarissa Hanna MD   Stopped at 02/05/21 0939    fentaNYL 10 mcg/mL infusion  25 mcg/hr Intravenous Continuous Eulogio Taniya Crabtree MD 2.5 mL/hr at 02/04/21 2030 25 mcg/hr at 02/04/21 2030    ampicillin-sulbactam (UNASYN) 1500 mg IVPB minibag  1,500 mg Intravenous Q6H Cristy Kang  mL/hr at 02/05/21 1116 1,500 mg at 02/05/21 1116    propofol injection  5-50 mcg/kg/min Intravenous Titrated Zach Webb MD 84.2 mL/hr at 02/05/21 0210 50 mcg/kg/min at 02/05/21 0210    sodium chloride flush 0.9 % injection 10 mL  10 mL Intravenous 2 times per day Micha Churchill MD   10 mL at 02/05/21 0940    sodium chloride flush 0.9 % injection 10 mL  10 mL Intravenous PRN Jesus Crabtree MD        promethazine (PHENERGAN) tablet 12.5 mg  12.5 mg Oral Q6H PRN Jesus Crabtree MD        Or    ondansetron (ZOFRAN) injection 4 mg  4 mg Intravenous Q6H PRN Jesus Crabtree MD        potassium chloride 10 mEq/100 mL IVPB (Peripheral Line)  10 mEq Intravenous PRN Jesus Crabtree  mL/hr at 02/04/21 0525 10 mEq at 02/04/21 0525    magnesium sulfate 2000 mg in 50 mL IVPB premix  2,000 mg Intravenous PRN Jesus Crabtree MD        pantoprazole (PROTONIX) injection 40 mg  40 mg Intravenous Daily Jesus Crabtree MD   40 mg at 02/05/21 0939       ROS: 10-12 ROS was Limited to obtain secondary to intubation and encephalopathy. Otherwise rest per HPI     Exam:   Vitals:    02/05/21 0600 02/05/21 0837 02/05/21 1047 02/05/21 1110   BP: 113/70 (!) 142/77 117/83 127/78   Pulse: 94 106 118 135   Resp: 21 23 26 (!) 34   Temp:  100.3 °F (37.9 °C) 100.8 °F (38.2 °C) 100.9 °F (38.3 °C)   TempSrc:  Core Core Core   SpO2: 96% 97% 95% 98%   Weight:       Height:         General appearance: intubated and sedated  Eye: No icterus  Fundus: Funduscopic examination could not be performed due to intubation COVID-19 restrictions. Neck: supple  Cardiovascular: No lower leg edema with good pulsation.    Mental Status: intubated  Cranial Nerves:   Pupil: Small pinpoint and sluggish reaction  No gaze preference  OCR: present  Corneal: no  Cough: no  Gag: No  Spontaneous breathing: No  III,IV,VI: No gaze preference. VII: Face is symmetric   CN from VIII to XII: Cannot be examined due to intubation  Musculoskeletal: Slight motor posturing to painful stimulation  Tone: normal  Reflexes: diminished but symmetric   Planters: mute  Coordination: Sensation and Gait/Posture: Cannot be examined due to intubation. Data:  LABS:   Lab Results   Component Value Date     02/05/2021    K 4.1 02/05/2021    K 4.4 02/04/2021     02/05/2021    CO2 20 02/05/2021    BUN 30 02/05/2021    CREATININE 1.6 02/05/2021    GFRAA 52 02/05/2021    LABGLOM 43 02/05/2021    GLUCOSE 134 02/05/2021    MG 2.50 02/03/2021    CALCIUM 8.1 02/05/2021     Lab Results   Component Value Date    WBC 22.9 02/05/2021    RBC 4.81 02/05/2021    HGB 13.3 02/05/2021    HCT 40.9 02/05/2021    MCV 85.2 02/05/2021    RDW 13.3 02/05/2021     02/05/2021     Lab Results   Component Value Date    INR 1.14 02/03/2021    PROTIME 13.2 02/03/2021       Neuroimaging were independently reviewed by me  Reviewed notes from different physicians  Reviewed lab and blood testing    Impression:  Acute encephalopathy, status post cardiopulmonary arrest.  Severe. Concern for severe anoxic injury  New onset seizure likely post anoxic seizure  Acute respiratory failure with hypoxia  Septic shock  V. fib arrest  Aspiration pneumonia  Cardiogenic shock  Elevated troponin    Recommendation:  EEG  Loaded with Keppra according to his creatinine clearance and watch his kidney function testing  Continue antibiotics  Hydration  Insulin sliding scale  Respiratory support  Follow electrolytes and CBC  Wean sedation gradually when medically stable  Continue current supportive care  Follow troponin  Discussed with ICU nurse and ICU team  Prognosis is guarded  We will follow  MDM: High      Thank you for referring such patient.  If you have any questions regarding my consult note, please don't hesitate to call me. Jimenez Wise MD  [phone]    This dictation was generated by voice recognition computer software.  Although all attempts are made to edit the dictation for accuracy, there may be errors in the  transcription that are not intended

## 2021-02-05 NOTE — PROGRESS NOTES
02/05/21 1758   Vent Patient Data   Peak Inspiratory Pressure 22 cmH2O   Mean Airway Pressure 14 cmH20   Rate Measured 24 br/min   Vt Exhaled 547 mL   Minute Volume 13.5 Liters   I:E Ratio 1:1.9   Plateau Pressure 22 [id number]   Static Compliance 45.58 mL/cmH2O   Dynamic Compliance 45.58 mL/cmH2O   Total PEEP 9.4 cmH20   Auto PEEP -0.6 cmH20

## 2021-02-05 NOTE — PROGRESS NOTES
Patient HR up to a least 160's, respirations up to 40, body tremors.     Message to [facility]

## 2021-02-05 NOTE — PROCEDURES
2/5/2021     Patient: Pardeep Danielle    MR Number: [id number]  YOB: 1950  Date of Visit: 2/5/2021    Clinical History:    The patient is a 65y.o. years old male with anoxic brain insult status post  arrest.  Now with recurrent focal muscle twitches in the left and right concerning for post anoxic seizures. Medications reviewed. Method: The EEG was performed utilizing the international 10/20 of electrode placements of both referential and bipolar montages. The patient is in coma through out the recording. Findings: The first half of the EEG recording was performed while the patient was off sedation. During that time, generalized delta rhythmic slowing was seen throughout the recording a 2 to 3 Hz/s with average amplitude with occasional periodic pattern. Patient had few jerking episodes during such recording and ictal EEG was compromised by generalized motion and muscle tension artifact. No more jerking activity when propofol was restarted. EEG at that time showed generalized delta slowing with generalized periodic pattern. Impression: This EEG is abnormal. The generalized diffuse slowing is suggestive of severe diffuse encephalopathy. The patient had few brief generalized seizures which were terminated by initiation of sedation.        Javier Gillespie MD      Board certified in clinical neurophysiology

## 2021-02-06 NOTE — PROGRESS NOTES
Mauricio Daly  Neurology Follow-up  [facility] Neurology    Date of Service: 2/6/2021    Subjective:   CC: Follow up today regarding: Anoxic brain insult and seizure. Events noted. Chart and lab reviewed. The patient had few motor seizure off sedation which was evident with EEG. He was loaded with Keppra and now he is on propofol and fentanyl. No clinical seizure on sedation. Does have brainstem function. Long discussion with his family today.       ROS: limited due to intubation        Family history: Noncontributory      Past Medical History:   Diagnosis Date    GERD (gastroesophageal reflux disease)     Hypertension     Kidney stone      Current Facility-Administered Medications   Medication Dose Route Frequency Provider Last Rate Last Admin    LORazepam (ATIVAN) injection 2 mg  2 mg Intravenous Q4H PRN Julien Trent MD   2 mg at 02/06/21 1016    levetiracetam (KEPPRA) 1000 mg/100 mL IVPB  1,000 mg Intravenous Q12H Rei Mcarthur MD        fosphenytoin (CEREBYX) 820 mg PE in dextrose 5 % 100 mL IVPB (loading dose)  10 mg PE/kg Intravenous Once Alicia Reza .8 mL/hr at 02/06/21 1352 820 mg PE at 02/06/21 1352    [START ON 2/7/2021] fosphenytoin (CEREBYX) 100 mg PE in dextrose 5 % 50 mL IVPB (maintenance dose)  2.5 mg PE/kg/day Intravenous Q12H Alicia Reza MD        acetaminophen (TYLENOL) suppository 650 mg  650 mg Rectal Q4H PRN Julien Trent MD   650 mg at 02/06/21 1352    fentaNYL 10 mcg/mL infusion  25 mcg/hr Intravenous Continuous Jesus Crabtree MD 2.5 mL/hr at 02/04/21 2030 25 mcg/hr at 02/04/21 2030    ampicillin-sulbactam (UNASYN) 1500 mg IVPB minibag  1,500 mg Intravenous Q6H Rei Mcarthur MD   Stopped at 02/06/21 0900    propofol injection  5-50 mcg/kg/min Intravenous Titrated Serina Harmon MD 15.3 mL/hr at 02/06/21 1019 70 mcg/kg/min at 02/06/21 1019    sodium chloride flush 0.9 % injection 10 mL  10 mL Intravenous 2 times per day Jesus Crabtree, MD   10 mL at 02/06/21 0831    sodium chloride flush 0.9 % injection 10 mL  10 mL Intravenous PRN Jesus Crabtree MD        promethazine (PHENERGAN) tablet 12.5 mg  12.5 mg Oral Q6H PRN Jesus Crabtree MD        Or    ondansetron (ZOFRAN) injection 4 mg  4 mg Intravenous Q6H PRN Jesus Crabtree MD        potassium chloride 10 mEq/100 mL IVPB (Peripheral Line)  10 mEq Intravenous PRN Emmettus Henrietta Crabtree  mL/hr at 02/04/21 0525 10 mEq at 02/04/21 0525    magnesium sulfate 2000 mg in 50 mL IVPB premix  2,000 mg Intravenous PRN Jesus Crabtree MD        pantoprazole (PROTONIX) injection 40 mg  40 mg Intravenous Daily Jesus Crabtree MD   40 mg at 02/06/21 0831     No Known Allergies   reports that he has never smoked. He does not have any smokeless tobacco history on file. He reports that he does not drink alcohol. Objective:  Exam:  Constitutional:   Vitals:    02/06/21 1112 02/06/21 1200 02/06/21 1300 02/06/21 1400   BP:    (!) 105/56   Pulse: 107 101 110 105   Resp: 23   23   Temp:  101.4 °F (38.6 °C)  101.5 °F (38.6 °C)   TempSrc:  Bladder     SpO2: 98%      Weight:       Height:           General appearance: intubated and sedated  Eye: No icterus. Neck: supple  Cardiovascular:    No lower leg edema with good pulsation. Mental Status: intubated  Cranial Nerves:   Pupil: RRR  No gaze preference  OCR: present  Corneal: No  Cough: No  Gag: Yes  Spontaneous breathing: Yes  II: Pupils: Small but reactive  III,IV,VI: No gaze preference. VII: Face is symmetric   CN from VIII to XII: Cannot be tested due to intubation.   Musculoskeletal: no motor response  Tone: normal  Reflexes: diminished but symmetric   Planters: mute        Data:  LABS:   Lab Results   Component Value Date     02/06/2021    K 4.1 02/06/2021    K 4.4 02/04/2021     02/06/2021    CO2 24 02/06/2021    BUN 23 02/06/2021    CREATININE 1.0 02/06/2021    GFRAA >60 02/06/2021    LABGLOM >60 recognition computer software. Although all attempts are made to edit the dictation for accuracy, there may be errors in the transcription that are not intended.

## 2021-02-06 NOTE — PROGRESS NOTES
Pt with no response to pain. Corneal, gag, cough reflex absent. Pupils  Non reactive to light. Pt with frequent siezure activity. Ativan given and Keppra started. Son in. Dr. Brizuela Life discussed pt prognosis and course of treatment in length with son. Continues as full code. EEG done.

## 2021-02-06 NOTE — PROGRESS NOTES
02/06/21 0717   Vent Patient Data   Plateau Pressure 27 [id number]   Static Compliance 37.6 mL/cmH2O   Dynamic Compliance 22 mL/cmH2O

## 2021-02-06 NOTE — FLOWSHEET NOTE
1047: Phone message left via [employer] for Neurologist - \"Pt having near-continuous seizures; hospitalist added Q4 ativan and increased sedation. Would you like to increase keppra dose or add other orders? Also, family would like to speak with you either during rounds or at your convenience. Thank you. \"    Neuro coming to bedside.

## 2021-02-06 NOTE — PROGRESS NOTES
PULMONARY AND CRITICAL CARE MEDICINE PROGRESS NOTE    Subjective: Patient remains sedated with propofol at 70 mcg/kg/min. Unfortunately, when propofol is decreased patient started having seizures. He was also noted to have seizures on EEG. He otherwise remains unresponsive. ROS could not be obtained due to patient factors. MEDICATIONS:     Scheduled Meds:   levetiracetam  1,000 mg Intravenous Q12H    [START ON 2/7/2021] fosphenytoin (CEREBYX) maintenance dose IVPB  2.5 mg PE/kg/day Intravenous Q12H    ampicillin-sulbactam  1,500 mg Intravenous Q6H    sodium chloride flush  10 mL Intravenous 2 times per day    pantoprazole  40 mg Intravenous Daily       Current Infusions:    fentaNYL 25 mcg/hr (02/04/21 2030)    propofol 70 mcg/kg/min (02/06/21 1019)       PRN meds:  LORazepam, acetaminophen, sodium chloride flush, promethazine **OR** ondansetron, potassium chloride, magnesium sulfate    PHYSICAL EXAM:  BP (!) 105/56   Pulse 105   Temp 101.5 °F (38.6 °C)   Resp 23   Ht 5' 7\" (1.702 m)   Wt 180 lb 8 oz (81.9 kg)   SpO2 98%   BMI 28.27 kg/m²  I/O last 3 completed shifts: In: 1164 [I.V.:964; IV Piggyback:200]  Out: 1050 [Urine:1050] No intake/output data recorded. Intake/Output Summary (Last 24 hours) at 2/6/2021 1521  Last data filed at 2/6/2021 1406  Gross per 24 hour   Intake 1164 ml   Output 1050 ml   Net 114 ml       CONSTITUTIONAL: He is a 65y.o.-year-old who appears his stated age. He is in no acute distress. CARDIOVASCULAR: S1 S2 RRR. Without murmer  RESPIRATORY & CHEST: Lungs are clear to auscultation and percussion. No wheezing, no crackles. Good air movement  GASTROINTESTINAL & ABDOMEN: Soft, nontender, positive bowel sounds in all quadrants, non-distended, without hepatosplenomegaly. GENITOURINARY: Deferred. MUSCULOSKELETAL: No tenderness to palpation of the axial skeleton. There is no clubbing. No cyanosis. No edema of the lower extremities.    SKIN OF BODY: No rash also noted on EEG. Currently he is on propofol up to 70 mcg/kg/min to control seizures. Keppra dose will be increased. He will also be noted for fosphenytoin and continued on Dilantin. If propofol cannot be decreased, then plan will be to add Versed to control seizures.       V. fib arrest.  Positive troponin.  Patient is requiring Levophed as well as epinephrine.  Titrate for mean arterial pressure of more than 65 mmHg.  Echocardiogram from today showed EF of 50%.  Abnormal septal wall motion.  Positive troponins.  No invasive cardiac procedures because of poor neurological prognostication per cardiology.     Remains on the ventilator for acute hypoxic and hypercapnic respiratory failure.  Patient with adequate gas exchange.  Continue current mechanical ventilation settings.  Titrate FiO2 for saturation of 90 to 95%.  Chest x-ray suggestive of pulmonary edema. Cristhina Kilts is also concern for possible aspiration in the lower lobes on CT chest.     Acute kidney injury. Has normalized.  Monitor urine output.     Continue Unasyn to cover for aspiration pneumonia.    Leukocytosis is improving. However, he continues to have fevers which are likely neuro related. Pepcid for stress ulcer prophylaxis. Lovenox prophylaxis dose if no bleeding noted from endotracheal tube.     Poor prognosis.  Concern for anoxic encephalopathy. Case was discussed with neurology. Plan is to provide supportive care for the over the next 48 hours to control seizures. Family has been prognosticate about poor outcome and poor neurological recovery. Critical Care Time: 39 Minutes  Total critical care time caring for this patient with life threatening illness, including direct patient contact, management of life support systems, review of data including imaging and labs, discussions with other team members and physicians excluding procedures.       Shayna Vela MD  Pulmonary Critical Care and Sleep Medicine  2/6/2021, 3:21 PM    This note was completed using dragon medical speech recognition software. Grammatical errors, random word insertions, pronoun errors and incomplete sentences are occasional consequences of this technology due to software limitations. If there are questions or concerns about the content of this note of information contained within the body of this dictation they should be addressed with the provider for clarification.

## 2021-02-06 NOTE — PROGRESS NOTES
Shift assessment. Patient remains sedated with propofol, on vent. Vent settings per orders, tolerating well. Propofol at 50 and KVO line. Pt is febrile with highest temp at 102.0. Tylenol and ice packs effective to bring temp down to 100. Pt has seizure activity went stimulated or sedation is off longer than 1-2 minutes, seizure activity subsides with sedation. No pain response. Pupils react very sluggishly and are equal. Gag reflex is absent. Right triple lumen fem line in place.

## 2021-02-06 NOTE — PROGRESS NOTES
[facility] PROGRESS NOTE    2/6/2021 3:43 PM        Name: Maggie Shelton . Admitted: 2/3/2021  Primary Care Provider: Ruben Phan MD (Tel: [phone])    Brief Course:   Patient is 65years old male with past medical history significant for hypertension GERD who presented after suffering cardiac arrest, patient was found down unresponsive by his son, CPR was not initiated, on EMS arrival he was found to be in V. fib arrest, patient was defibrillated, amiodarone and epinephrine with 5 rounds of CPR, in ER he continued to DNR cardiac arrest ROSC was not achieved for prolonged period of time. He was admitted to ICU for further evaluation is currently intubated and sedated    CC: Status post cardiac arrest    Subjective:  .   Intubated and sedated  Suction blood from ET tube  No acute event overnight  Status post V. fib arrest  Had another seizure this morning given Ativan  On Keppra  Leukocytosis improving  Covid testing negative    Reviewed interval ancillary notes    Current Medications      LORazepam (ATIVAN) injection 2 mg, Q4H PRN      levetiracetam (KEPPRA) 1000 mg/100 mL IVPB, Q12H      [START ON 2/7/2021] fosphenytoin (CEREBYX) 100 mg PE in dextrose 5 % 50 mL IVPB (maintenance dose), Q12H      acetaminophen (TYLENOL) suppository 650 mg, Q4H PRN      fentaNYL 10 mcg/mL infusion, Continuous      ampicillin-sulbactam (UNASYN) 1500 mg IVPB minibag, Q6H      propofol injection, Titrated      sodium chloride flush 0.9 % injection 10 mL, 2 times per day      sodium chloride flush 0.9 % injection 10 mL, PRN      promethazine (PHENERGAN) tablet 12.5 mg, Q6H PRN    Or      ondansetron (ZOFRAN) injection 4 mg, Q6H PRN      potassium chloride 10 mEq/100 mL IVPB (Peripheral Line), PRN      magnesium sulfate 2000 mg in 50 mL IVPB premix, PRN      pantoprazole (PROTONIX) injection 40 mg, Daily        Objective:  BP (!) 105/56   Pulse 105   Temp 101.5 °F (38.6 °C)   Resp 23   Ht 5' 7\" (1.702 m)   Wt 180 lb 8 oz (81.9 kg)   SpO2 98%   BMI 28.27 kg/m²     Intake/Output Summary (Last 24 hours) at 2/6/2021 1543  Last data filed at 2/6/2021 1406  Gross per 24 hour   Intake 1164 ml   Output 1050 ml   Net 114 ml      Wt Readings from Last 3 Encounters:   02/06/21 180 lb 8 oz (81.9 kg)   03/11/19 176 lb 4 oz (79.9 kg)   09/19/16 175 lb (79.4 kg)       General appearance: Had seizure this morning  Eyes: Sclera clear. Pupils equal.  ENT: Moist oral mucosa. Trachea midline, no adenopathy. Cardiovascular: Regular rhythm, normal S1, S2. No murmur. No edema in lower extremities  Respiratory: Not using accessory muscles. Good inspiratory effort. Clear to auscultation bilaterally, no wheeze or crackles. GI: Abdomen soft, no tenderness, not distended, normal bowel sounds  Musculoskeletal: No cyanosis in digits, neck supple  Neurology: Intubated  Psych: Intubated sedated  Skin: Warm, dry, normal turgor  Extremity exam shows brisk capillary refill. Peripheral pulses are palpable in lower extremities     Labs and Tests:  CBC:   Recent Labs     02/04/21  0503 02/05/21  0451 02/06/21  0445   WBC 40.5* 22.9* 19.6*   HGB 15.7 13.3* 11.8*    127* 128*     BMP:    Recent Labs     02/04/21  0502 02/05/21  0451 02/06/21  0445    135* 139   K 4.4 4.1 4.1    101 105   CO2 19* 20* 24   BUN 23* 30* 23*   CREATININE 2.0* 1.6* 1.0   GLUCOSE 181* 134* 109*     Hepatic:   Recent Labs     02/03/21  1718   *   *   BILITOT <0.2   ALKPHOS 94     XR CHEST PORTABLE   Final Result   Slightly decreased right-sided pulmonary disease but increased left-sided   pulmonary disease with new consolidative disease in the left lung base and   possible new left pleural effusion         CT CHEST WO CONTRAST   Final Result   Cervical: Degenerative disc disease. Trace anterolisthesis of T1 on T2.   No   gross fracture. Chest: Diffuse bilateral heterogeneous opacity, greatest within the lower   lobes, for which leading considerations include pulmonary edema, pneumonia,   contusion, or hemorrhage. Multiple bilateral anterior rib fractures as discussed above. CT CERVICAL SPINE WO CONTRAST   Final Result   Cervical: Degenerative disc disease. Trace anterolisthesis of T1 on T2. No   gross fracture. Chest: Diffuse bilateral heterogeneous opacity, greatest within the lower   lobes, for which leading considerations include pulmonary edema, pneumonia,   contusion, or hemorrhage. Multiple bilateral anterior rib fractures as discussed above. CT HEAD WO CONTRAST   Final Result   Motion limited exam demonstrating atrophy and small vessel ischemic disease. Moderate paranasal sinus disease. XR CHEST PORTABLE   Final Result   Cardiomegaly with pulmonary edema             Discussed with ICU nurse at bedside    Problem List  Active Problems:    Hypoxic ischemic encephalopathy    New onset seizure ([address]    Acute encephalopathy    Acute respiratory failure with hypoxia ([facility])  Resolved Problems:    * No resolved hospital problems.  *       Assessment & Plan:     Status post V. fib arrest  Acute hypoxic respiratory failure  Seizure  Aspiration pneumonia  Acute kidney injury  Cardiogenic shock    Continue ICU care  Critical care on board  Had EEG yesterday  Covid testing negative  Had seizure today, on [address]   Neurology started on fosphenytoin  Continue Dilantin  Keppra was increased to 1 g twice daily  Concern added Versed to propofol  Had Ativan, will keep as needed    Echo shows abnormal septal wall motion cannot exclude additional regional changes  Borderline systolic function with EF 50%  No invasive cardiac procedure at this time  Continue supportive management  Serial cardiac enzyme  Echo noted as above    He was started on vancomycin and cefepime  Antibiotic was discontinued by [facility], started on Unasyn for aspiration pneumonia    Overall prognosis poor, I spoke today with patient's son at bedside  He understand the poor prognosis, he will talk to the rest of the family in regards of CODE STATUS, currently we will keep patient full code. IV Access: Central line  [place]: Yes  Diet: Diet NPO Effective Now  Code:Full Code  DVT PPX we will hold today, noted bleeding again from ET tube. SCD  Disposition continue ICU care    Spoke with patient's son at bedside, overall prognosis is very guarded    Due to the immediate potential for life-threatening deterioration due to status post cardiac arrest, I spent 35 minutes providing critical care. This time is excluding time spent performing procedures.       Susanne Hennessy MD   2/6/2021 3:43 PM

## 2021-02-07 NOTE — PROGRESS NOTES
PULMONARY AND CRITICAL CARE MEDICINE PROGRESS NOTE    Subjective: Patient is seen at bedside. He remains on high-dose propofol. This morning he had to be started on fentanyl because of respiratory distress. He is on Keppra as well as Dilantin and still continues to have seizures intermittently. ROS could not be obtained due to patient factors. MEDICATIONS:     Scheduled Meds:   albuterol sulfate HFA  6 puff Inhalation Q4H    levetiracetam  1,000 mg Intravenous Q12H    fosphenytoin (CEREBYX) maintenance dose IVPB  2.5 mg PE/kg/day Intravenous Q12H    ampicillin-sulbactam  1,500 mg Intravenous Q6H    sodium chloride flush  10 mL Intravenous 2 times per day    pantoprazole  40 mg Intravenous Daily       Current Infusions:    dexmedetomidine Stopped (02/07/21 1037)    norepinephrine 4 mcg/min (02/07/21 1129)    midazolam      fentaNYL 25 mcg/hr (02/07/21 1004)    propofol 70 mcg/kg/min (02/07/21 1222)       PRN meds:  LORazepam, acetaminophen, sodium chloride flush, promethazine **OR** ondansetron, potassium chloride, magnesium sulfate    PHYSICAL EXAM:  /72   Pulse 72   Temp 97.9 °F (36.6 °C) (Bladder)   Resp 17   Ht 5' 7\" (1.702 m)   Wt 201 lb 4.5 oz (91.3 kg)   SpO2 98%   BMI 31.52 kg/m²  I/O last 3 completed shifts: In: 430 [I.V.:230; IV Piggyback:200]  Out: 1625 [Urine:1625] No intake/output data recorded. Intake/Output Summary (Last 24 hours) at 2/7/2021 1301  Last data filed at 2/7/2021 0400  Gross per 24 hour   Intake 430 ml   Output 1625 ml   Net -1195 ml       CONSTITUTIONAL: He is a 65y.o.-year-old who appears his stated age. He is in no acute distress. CARDIOVASCULAR: S1 S2 RRR. Without murmer  RESPIRATORY & CHEST: Lungs are clear to auscultation and percussion. No wheezing, no crackles. Good air movement  GASTROINTESTINAL & ABDOMEN: Soft, nontender, positive bowel sounds in all quadrants, non-distended, without hepatosplenomegaly. GENITOURINARY: Deferred. MUSCULOSKELETAL: No tenderness to palpation of the axial skeleton. There is no clubbing. No cyanosis. No edema of the lower extremities. SKIN OF BODY: No rash or jaundice. PSYCHIATRIC EVALUATION: Normal affect. Patient answers questions appropriately. HEMATOLOGIC/LYMPHATIC/ IMMUNOLOGIC: No palpable lymphadenopathy. NEUROLOGIC: Unresponsive. No response to any painful stimulus. Pupils are pinpoint and nonreactive. No corneal reflex. Minimal cough. LABS:    Recent Labs     02/05/21  0451 02/06/21  0445 02/07/21  0545   WBC 22.9* 19.6* 14.6*   RBC 4.81 4.20 3.97*   HGB 13.3* 11.8* 11.2*   HCT 40.9 36.5* 34.5*   MCH 27.7 28.1 28.1   MCHC 32.6 32.3 32.4   RDW 13.3 13.3 13.3   * 128* 142   MPV 9.7 9.1 9.9   NEUTOPHILPCT 86.5 83.1 76.0   MONOPCT 8.5 8.4 10.0   BASOPCT 0.1 0.5 0.3     Recent Labs     02/05/21  0451 02/06/21  0445 02/07/21  0545   * 139 138   K 4.1 4.1 3.8    105 106   ANIONGAP 14 10 7   CO2 20* 24 25   BUN 30* 23* 20   CREATININE 1.6* 1.0 0.9   CALCIUM 8.1* 7.9* 7.6*   GLUCOSE 134* 109* 106*     Recent Labs     02/05/21  1710 02/07/21  0553   PHART 7.475* 7.409   UUB1UYD 34.9* 41.0   PO2ART 66.3* 112.0*   PGN1DKX 25.6 25.9   P4NRPBFZ 94.8 99.0   BEART 2.2 1.1       IMAGING:  I reviewed the chest x-ray from 2/7/2021 and my interpretation is as follows. Bilateral pulmonary edema, improved. I reviewed the CT chest and my interpretation is as follows.  Bilateral pulmonary edema.  Dense infiltrates in bilateral lower lobes, concern for aspiration.        IMPRESSION:  Cardiopulmonary arrest   Acute hypercapnic/hypoxic respiratory failure  Acute kidney injury  Aspiration pneumonia  Shock, likely cardiogenic        PLAN:  Patient had prolonged downtime after cardiac arrest.  Concern for possible anoxic injury.  Initial CT head showed atrophy and small vessel ischemic disease.    Patient was noted to have seizures which was also noted on EEG.   Currently he is on propofol up to word insertions, pronoun errors and incomplete sentences are occasional consequences of this technology due to software limitations. If there are questions or concerns about the content of this note of information contained within the body of this dictation they should be addressed with the provider for clarification.

## 2021-02-07 NOTE — FLOWSHEET NOTE
Pt continues with fever, seizures controlled by meds at this time. Family at bedside. No other acute changes noted on reassessment, see flowsheets. VSS. Will continue to monitor.      Electronically signed by Carlota Correa RN BSN

## 2021-02-07 NOTE — PROGRESS NOTES
02/07/21 0812   Vent Patient Data   Plateau Pressure 12 [id number]   Static Compliance 66.9 mL/cmH2O   Dynamic Compliance 39 mL/cmH2O

## 2021-02-07 NOTE — PROGRESS NOTES
Maggie Shelton  Neurology Follow-up  [facility]    Date of Service: 2/7/2021    Subjective:   CC: Follow up today regarding: Anoxic brain insult and seizure. Events noted. Chart and lab reviewed. No major events overnight. He had few breakthrough seizure for few seconds to minutes. Now is intubated on sedation including propofol, fentanyl and some Precedex. He is now on Keppra and fosphenytoin. Does have brainstem function. Other review of system was limited. Discussed with nurse.       ROS: limited due to intubation        Family history: Noncontributory      Past Medical History:   Diagnosis Date    GERD (gastroesophageal reflux disease)     Hypertension     Kidney stone      Current Facility-Administered Medications   Medication Dose Route Frequency Provider Last Rate Last Admin    dexmedetomidine (PRECEDEX) 400 mcg in sodium chloride 0.9 % 100 mL infusion  0.2-1.4 mcg/kg/hr Intravenous Continuous Estelle Guallpa MD   Stopped at 02/07/21 1037    albuterol sulfate  (90 Base) MCG/ACT inhaler 6 puff  6 puff Inhalation Q4H Estelle Guallpa MD   6 puff at 02/07/21 1201    norepinephrine (LEVOPHED) 16 mg in dextrose 5% 250 mL infusion  2-100 mcg/min Intravenous Continuous Estelle Guallpa MD 3.8 mL/hr at 02/07/21 1129 4 mcg/min at 02/07/21 1129    midazolam (VERSED) 100 mg in dextrose 5 % 100 mL infusion  1-10 mg/hr Intravenous Continuous Tamer Carl Seip, MD        LORazepam (ATIVAN) injection 2 mg  2 mg Intravenous Q4H PRN Teetee Du MD   2 mg at 02/07/21 0614    levetiracetam (KEPPRA) 1000 mg/100 mL IVPB  1,000 mg Intravenous Q12H Estelle Guallpa MD   Stopped at 02/07/21 0834    fosphenytoin (CEREBYX) 100 mg PE in dextrose 5 % 50 mL IVPB (maintenance dose)  2.5 mg PE/kg/day Intravenous Q12H Ewa Phan  mL/hr at 02/07/21 1254 100 mg PE at 02/07/21 1254    acetaminophen (TYLENOL) suppository 650 mg  650 mg Rectal Q4H PRN Teetee Du MD   650 mg at 02/07/21 6367    fentaNYL 10 mcg/mL infusion  25 mcg/hr Intravenous Continuous Jesus Crabtree MD 2.5 mL/hr at 02/07/21 1004 25 mcg/hr at 02/07/21 1004    ampicillin-sulbactam (UNASYN) 1500 mg IVPB minibag  1,500 mg Intravenous Q6H Em Aguilar MD   Stopped at 02/07/21 1047    propofol injection  5-50 mcg/kg/min Intravenous Titrated Cayla German MD 52.1 mL/hr at 02/07/21 1222 70 mcg/kg/min at 02/07/21 1222    sodium chloride flush 0.9 % injection 10 mL  10 mL Intravenous 2 times per day Franky Monzon MD   10 mL at 02/07/21 0811    sodium chloride flush 0.9 % injection 10 mL  10 mL Intravenous PRN Jesus Crabtree MD        promethazine (PHENERGAN) tablet 12.5 mg  12.5 mg Oral Q6H PRN Jesus Crabtree MD        Or    ondansetron (ZOFRAN) injection 4 mg  4 mg Intravenous Q6H PRN Jesus Crabtree MD        potassium chloride 10 mEq/100 mL IVPB (Peripheral Line)  10 mEq Intravenous PRN Jesus Crabtree  mL/hr at 02/04/21 0525 10 mEq at 02/04/21 0525    magnesium sulfate 2000 mg in 50 mL IVPB premix  2,000 mg Intravenous PRN Jesus Crabtree MD        pantoprazole (PROTONIX) injection 40 mg  40 mg Intravenous Daily Jesus Crabtree MD   40 mg at 02/07/21 0810     No Known Allergies   reports that he has never smoked. He does not have any smokeless tobacco history on file. He reports that he does not drink alcohol. Objective:  Exam:  Constitutional:   Vitals:    02/07/21 1000 02/07/21 1100 02/07/21 1200 02/07/21 1202   BP: (!) 78/53 (!) 82/54 122/72    Pulse: 80 70 72 72   Resp: 14 16 17    Temp:   97.9 °F (36.6 °C)    TempSrc:   Bladder    SpO2: 99% 100% 98% 98%   Weight:       Height:           General appearance: intubated and sedated  Eye: No icterus. Neck: supple  Cardiovascular:    No lower leg edema with good pulsation.    Mental Status: intubated  Cranial Nerves:   Pupil: RRR  No gaze preference  OCR: present  Corneal: No  Cough: No  Gag: Yes  Spontaneous breathing: Yes  II: Pupils: Small but reactive  III,IV,VI: No gaze preference. VII: Face is symmetric   Musculoskeletal: no motor response  Tone: normal  Reflexes: diminished but symmetric   Planters: mute  No changes today. Data:  LABS:   Lab Results   Component Value Date     02/07/2021    K 3.8 02/07/2021    K 4.4 02/04/2021     02/07/2021    CO2 25 02/07/2021    BUN 20 02/07/2021    CREATININE 0.9 02/07/2021    GFRAA >60 02/07/2021    LABGLOM >60 02/07/2021    GLUCOSE 106 02/07/2021    MG 2.50 02/03/2021    CALCIUM 7.6 02/07/2021     Lab Results   Component Value Date    WBC 14.6 02/07/2021    RBC 3.97 02/07/2021    HGB 11.2 02/07/2021    HCT 34.5 02/07/2021    MCV 86.8 02/07/2021    RDW 13.3 02/07/2021     02/07/2021     Lab Results   Component Value Date    INR 1.14 02/03/2021    PROTIME 13.2 02/03/2021       Reviewed recent labs  Reviewed notes from different physicians. Impression: The same today. No change. Acute encephalopathy, status post cardiopulmonary arrest.  Severe. Concern for severe anoxic injury  New onset seizure likely post anoxic seizure  Acute respiratory failure with hypoxia  Septic shock  V. fib arrest  Aspiration pneumonia  Cardiogenic shock  Elevated troponin         Recommendation    The patient continues to have the breakthrough seizure on different sedatives and AED  We will start Versed drip for the next 24 hours and try to decrease his propofol and other sedatives provided no clinical seizures. Continue Keppra and Dilantin the same dose for now  Continue respiratory support  Antibiotics  Hydration  Blood sugar monitor  Follow electrolytes  Poor prognosis and concern for severe brain anoxic insult and vegetative state. If the patient continues have breakthrough seizures despite different sedatives and EEG, we will then have another discussion with family regarding future care. Repeat EEG tomorrow  We will follow  Discussed with ICU nurse.          Ofe Teran MD [phone]      This dictation was generated by voice recognition computer software. Although all attempts are made to edit the dictation for accuracy, there may be errors in the transcription that are not intended.

## 2021-02-07 NOTE — PROGRESS NOTES
[facility] PROGRESS NOTE    2/7/2021 1:56 PM        Name: Emile Rodriguez . Admitted: 2/3/2021  Primary Care Provider: Maida Murillo MD (Tel: [phone])    Brief Course:   Patient is 65years old male with past medical history significant for hypertension GERD who presented after suffering cardiac arrest, patient was found down unresponsive by his son, CPR was not initiated, on EMS arrival he was found to be in V. fib arrest, patient was defibrillated, amiodarone and epinephrine with 5 rounds of CPR, in ER he continued to DNR cardiac arrest ROSC was not achieved for prolonged period of time. He was admitted to ICU for further evaluation is currently intubated and sedated    CC: Status post cardiac arrest    Subjective:  .   Intubated and sedated  Suction blood from ET tube  No acute event overnight  Status post V. fib arrest  Had another seizure this morning given Ativan  On Keppra  Leukocytosis improving  Covid testing negative  Asynchronous today with the ventilator  Sedation has been decreased  Neurology added Versed  Plan for EEG tomorrow        Reviewed interval ancillary notes    Current Medications      dexmedetomidine (PRECEDEX) 400 mcg in sodium chloride 0.9 % 100 mL infusion, Continuous      albuterol sulfate  (90 Base) MCG/ACT inhaler 6 puff, Q4H      norepinephrine (LEVOPHED) 16 mg in dextrose 5% 250 mL infusion, Continuous      midazolam (VERSED) 100 mg in dextrose 5 % 100 mL infusion, Continuous      LORazepam (ATIVAN) injection 2 mg, Q4H PRN      levetiracetam (KEPPRA) 1000 mg/100 mL IVPB, Q12H      fosphenytoin (CEREBYX) 100 mg PE in dextrose 5 % 50 mL IVPB (maintenance dose), Q12H      acetaminophen (TYLENOL) suppository 650 mg, Q4H PRN      fentaNYL 10 mcg/mL infusion, Continuous      ampicillin-sulbactam (UNASYN) 1500 mg IVPB minibag, Q6H      propofol injection, Titrated      sodium chloride flush 0.9 % injection 10 mL, 2 times per day      sodium chloride flush 0.9 % injection 10 mL, PRN      promethazine (PHENERGAN) tablet 12.5 mg, Q6H PRN    Or      ondansetron (ZOFRAN) injection 4 mg, Q6H PRN      potassium chloride 10 mEq/100 mL IVPB (Peripheral Line), PRN      magnesium sulfate 2000 mg in 50 mL IVPB premix, PRN      pantoprazole (PROTONIX) injection 40 mg, Daily        Objective:  /72   Pulse 72   Temp 97.9 °F (36.6 °C) (Bladder)   Resp 17   Ht 5' 7\" (1.702 m)   Wt 201 lb 4.5 oz (91.3 kg)   SpO2 98%   BMI 31.52 kg/m²     Intake/Output Summary (Last 24 hours) at 2/7/2021 1356  Last data filed at 2/7/2021 0400  Gross per 24 hour   Intake 430 ml   Output 1625 ml   Net -1195 ml      Wt Readings from Last 3 Encounters:   02/07/21 201 lb 4.5 oz (91.3 kg)   03/11/19 176 lb 4 oz (79.9 kg)   09/19/16 175 lb (79.4 kg)       General appearance: Had seizure   Eyes: Sclera clear. Pupils equal.  ENT: Moist oral mucosa. Trachea midline, no adenopathy. Cardiovascular: Regular rhythm, normal S1, S2. No murmur. No edema in lower extremities  Respiratory: Not using accessory muscles. Good inspiratory effort. Clear to auscultation bilaterally, no wheeze or crackles. GI: Abdomen soft, no tenderness, not distended, normal bowel sounds  Musculoskeletal: No cyanosis in digits, neck supple  Neurology: Intubated  Psych: Intubated sedated  Skin: Warm, dry, normal turgor  Extremity exam shows brisk capillary refill.   Peripheral pulses are palpable in lower extremities     Labs and Tests:  CBC:   Recent Labs     02/05/21 0451 02/06/21  0445 02/07/21  0545   WBC 22.9* 19.6* 14.6*   HGB 13.3* 11.8* 11.2*   * 128* 142     BMP:    Recent Labs     02/05/21 0451 02/06/21  0445 02/07/21  0545   * 139 138   K 4.1 4.1 3.8    105 106   CO2 20* 24 25   BUN 30* 23* 20   CREATININE 1.6* 1.0 0.9   GLUCOSE 134* 109* 106*     Hepatic:   No results for input(s): AST, shock    Continue ICU care  Critical care on board  Had EEG yesterday and again another EEG tomorrow  Covid testing negative  On Keppra and fosphenytoin  Neurology on board  Versed was added to propofol  Keppra was increased to 1 g twice daily  Concern added Versed to propofol  Had Ativan, will keep as needed    Echo shows abnormal septal wall motion cannot exclude additional regional changes  Borderline systolic function with EF 50%  No invasive cardiac procedure at this time  Continue supportive management  Serial cardiac enzyme  Echo noted as above    He was started on vancomycin and cefepime  Antibiotic was discontinued by [facility], started on Unasyn for aspiration pneumonia    Overall prognosis poor, I spoke today with patient's son at bedside  He understand the poor prognosis, he will talk to the rest of the family in regards of CODE STATUS, currently we will keep patient full code. Patient family to decide further goals of care      IV Access: Central line  Strat[place]: Yes  Diet: DIET TUBE FEED CONTINUOUS/CYCLIC NPO; STANDARD WITHOUT FIBER; Orogastric; Continuous; 25; 25  Code:Full Code  DVT PPX we will hold today, noted bleeding again from ET tube. SCD  Disposition continue ICU care, family to decide further goals of care    Spoke with patient's son at bedside, overall prognosis is very guarded    Prognosis: Very guarded      Due to the immediate potential for life-threatening deterioration due to status post cardiac arrest, I spent 35 minutes providing critical care. This time is excluding time spent performing procedures.       Racheal Hernandez MD   2/7/2021 1:56 PM

## 2021-02-08 NOTE — PROGRESS NOTES
PRN ativan given for seizure activity. Versed and propofol also increased. Tele monitor falsely reading severe tachycardia with heavy artifact. True pulse 100 at right radial. HR reading more accurate now.

## 2021-02-08 NOTE — PLAN OF CARE
Nutrition Problem #1: Inadequate oral intake  Intervention: Food and/or Nutrient Delivery: Continue NPO  Nutritional Goals: Con't to give nutrition support as appropriate with plan of care

## 2021-02-08 NOTE — PROGRESS NOTES
Assessment complete, VSS. Pt tolerating vent with no signs of distress. Pt does not respond to pain, pupils are non-reactive to light, corneal response is negative. Unable to assess gag at this time d/t suction tubing catching in ETT. Vent settings: AC/PC rate 23, pressure 12, PEEP 5, FIO2 60%. Pulses found in all extremities. No edema noted. Bowel sounds hypoactive, tube feed to be started per order. OG in place measuring 56 cm at the lip. Pt repositioned.

## 2021-02-08 NOTE — PROGRESS NOTES
[facility]    Follow Up Note    Subjective:   CHIEF COMPLAINT / HPI:   Chief Complaint   Patient presents with    Cardiac Arrest     Pt found down by family and into cardiac arrest when squad arrived. Pt in vfib 2 shocks delivered and then tamponade. 300 amiodarone given and sodium bicarb. Epi x5 given by squad. Virgil device in place upon arrival, PEA     Interval history: Patient originally admitted 2/3/2021 after cardiac arrest.  He was found down at home by family. EMS found him in V. fib. He was transported to ED where he remained in cardiac arrest.  He had prolonged resuscitation prior to [facility] 1772. Since resuscitation in ICU he has had recurrent seizures. He has had multiple seizures in the last 24 hours. Past Medical History:    Reviewed; no changes    Social History:    Reviewed; no changes    REVIEW OF SYSTEMS:    ROS is unobtainable due to his critical illness. Objective:   PHYSICAL EXAM:        VITALS:  /60   Pulse 66   Temp 97.5 °F (36.4 °C) (Bladder)   Resp 15   Ht 5' 7\" (1.702 m)   Wt 186 lb 1.1 oz (84.4 kg)   SpO2 98%   BMI 29.14 kg/m²  on mechanical ventilation     24HR INTAKE/OUTPUT:      Intake/Output Summary (Last 24 hours) at 2/8/2021 0916  Last data filed at 2/8/2021 0600  Gross per 24 hour   Intake 2513 ml   Output 1200 ml   Net 1313 ml       CONSTITUTIONAL: Nonresponsive on propofol, fentanyl, Keppra and fosphenytoin. Frequent seizure activity as described but he is not seizing at the time of my visit. LUNGS:  No increased work of breathing and clear to auscultation, no crackles or wheezes  CARDIOVASCULAR: S1 and S2 and no JVD  ABDOMEN:  normal bowel sounds, non-distended and non-tender to palpation  EXT: No edema, no calf tenderness. Pulses are present bilaterally.   NEUROLOGIC: Nonresponsive  SKIN:  normal skin color, texture, turgor, no redness, warmth, or swelling at IV sites    DATA:    CBC:  Recent Labs     02/06/21  0445 02/07/21  5646 02/08/21  0435   WBC 19.6* 14.6* 9.9   RBC 4.20 3.97* 3.70*   HGB 11.8* 11.2* 10.7*   HCT 36.5* 34.5* 32.0*   * 142 178   MCV 87.0 86.8 86.4   MCH 28.1 28.1 29.0   MCHC 32.3 32.4 33.5   RDW 13.3 13.3 13.5      BMP:  Recent Labs     02/06/21  0445 02/07/21  0545 02/08/21  0435    138 142   K 4.1 3.8 4.3    106 108   CO2 24 25 26   BUN 23* 20 26*   CREATININE 1.0 0.9 0.8   CALCIUM 7.9* 7.6* 8.0*   GLUCOSE 109* 106* 170*      ABG:  Recent Labs     02/05/21  1710 02/07/21  0553 02/08/21  0437   PHART 7.475* 7.409 7.388   HGV7FSA 34.9* 41.0 46.5*   PO2ART 66.3* 112.0* 91.9   KUE4SXX 25.6 25.9 28.0   N3RNOLHD 94.8 99.0 97.7   BEART 2.2 1.1 2.5       Cultures:    Abx:    Radiology Review:  Pertinent images / reports were reviewed as a part of this visit. Assessment:     1. Status post cardiopulmonary arrest with ROSC  2. Acute hypercapnic/hypoxemic respiratory failure  3. Severe anoxic encephalopathy  4. Aspiration pneumonia  5. Cardiogenic shock    I have reviewed laboratories, medical records and images for this visit    Patient appears to have profound anoxic encephalopathy following likely arrhythmic arrest with prolonged resuscitation before obtaining ROSC. He has had both gross clinical seizure activity and seizure activity on EEG. He is on propofol, fentanyl, Keppra and Dilantin. Still having breakthrough seizures. Could add Versed and/o 7.3 9/47/92 r phenobarbital.  Neurology is following. Prognosis is extremely poor. Patient continues to demonstrate cardiogenic shock. He did have elevated troponin and reduced ejection fraction. Continues to require norepinephrine and support of hypotension from cardiogenic shock. He is not an appropriate candidate for invasive evaluation at this point. Patient continues to demonstrate acute hypoxemic and hypercapnic respiratory failure requiring mechanical ventilation. I have reviewed and adjusted the ventilator at the bedside.   Current settings are AC/PC with tidal volume 400, rate 24, FiO2 0.5 and PEEP 5. His last ABG was 7.3 9/47/92. Chest x-ray continues to show pulmonary edema with increased density in the lower lobe. CT imaging was suggestive of potential aspiration. He continues to receive Unasyn for this. He is afebrile and leukocytosis has resolved. No guiding culture data. Anticipate 7-day course of antibiotics. Patient did initially have some bleeding from the endotracheal tube. It was for this reason that hypothermia was not employed. Bleeding has stopped. The patient is not on anticoagulation. He is receiving Protonix for GI prophylaxis. Prognosis for functional recovery is poor now 4 days into his hospitalization. Family is discussing options. Terminal extubation would be reasonable. Total critical care time caring for this patient with life threatening illness, including direct patient contact, management of life support systems, review of data including imaging and labs, discussions with other team members and physicians is at least 32 minutes so far today, excluding procedures.

## 2021-02-08 NOTE — PROCEDURES
2/8/2021     Patient: Emile Rodriguez    MR Number: [id number]  YOB: 1950  Date of Visit: 2/8/2021    Clinical History:    The patient is a 65y.o. years old male with anoxic brain injury and recurrent motor seizure. Medications reviewed. Method: The EEG was performed utilizing the international 10/20 of electrode placements of both referential and bipolar montages. The patient is in coma through out the recording. Findings: The background EEG on sedation showed diffuse low amplitude slowing throughout the recording with minimal reaction. With external stimulation, the patient had clinical motor seizure for few minutes during which, his ictal EEG showed diffuse EMG and muscle tension artifact with underlying rhythmic delta slowing for several minutes. This pattern was disrupted by reinitiating of his sedation     Impression: This EEG is abnormal. The generalized diffuse slowing is suggestive of severe diffuse encephalopathy. There is also evidence of generalized motor seizures with external stimulation letter lasted for few minutes and was improved after initiation of sedatives.   Edy Cleveland MD      Board certified in clinical neurophysiology

## 2021-02-08 NOTE — PLAN OF CARE
Nutrition Problem #1: Inadequate oral intake  Intervention: Food and/or Nutrient Delivery: Continue Current Tube Feeding  Nutritional Goals: Pt will mckenzie TF at goal rate

## 2021-02-08 NOTE — CARE COORDINATION
Discharge planning note:    Patient remains in ICU on Vent with sedation and Levophed gtt. Physicians are speaking to family about possible terminal extubation. May need Palliative Care consult    Not appropriate to discuss discharge planning.     Paul Gutierrez RN BSN  Case Management  [phone]

## 2021-02-08 NOTE — PROGRESS NOTES
Godfrey Dao  Neurology Follow-up  [facility] Neurology    Date of Service: 2/8/2021    Subjective:   CC: Follow up today regarding: Anoxic brain insult and seizure. Events noted. Chart and lab reviewed. The patient continues to have breakthrough seizures with external stimulation. Duration could be several minutes up to 10 minutes. Is currently on Versed, fentanyl and propofol max dose in addition to Keppra and Dilantin. Does have minimal brainstem function. Otherwise other review of system was limited.       ROS: limited due to intubation        Family history: Noncontributory      Past Medical History:   Diagnosis Date    GERD (gastroesophageal reflux disease)     Hypertension     Kidney stone      Current Facility-Administered Medications   Medication Dose Route Frequency Provider Last Rate Last Admin    furosemide (LASIX) injection 20 mg  20 mg Intravenous Once Delbert Escoto MD        dexmedetomidine (PRECEDEX) 400 mcg in sodium chloride 0.9 % 100 mL infusion  0.2-1.4 mcg/kg/hr Intravenous Continuous Kait Foster MD   Stopped at 02/07/21 1037    albuterol sulfate  (90 Base) MCG/ACT inhaler 6 puff  6 puff Inhalation Q4H Kait Foster MD   6 puff at 02/08/21 0411    norepinephrine (LEVOPHED) 16 mg in dextrose 5% 250 mL infusion  2-100 mcg/min Intravenous Continuous Kait Foster MD 1.9 mL/hr at 02/07/21 2144 2 mcg/min at 02/07/21 2144    midazolam (VERSED) 100 mg in dextrose 5 % 100 mL infusion  1-10 mg/hr Intravenous Continuous Anibal Chavira MD 8 mL/hr at 02/08/21 0822 8 mg/hr at 02/08/21 0060    LORazepam (ATIVAN) injection 2 mg  2 mg Intravenous Q4H PRN Ramírez Dhillon MD   2 mg at 02/08/21 0804    levetiracetam (KEPPRA) 1000 mg/100 mL IVPB  1,000 mg Intravenous Q12H Kait Foster MD   Stopped at 02/08/21 0837    fosphenytoin (CEREBYX) 100 mg PE in dextrose 5 % 50 mL IVPB (maintenance dose)  2.5 mg PE/kg/day Intravenous Q12H Anibal Chavira MD   Stopped at 02/08/21 0206 at this point until other family members arrive from other states and overseas before making such a decision. Continue current supportive care  Continue the same AED and sedation for now  Can use Ativan as needed for seizure more than 5 minutes duration  Insulin sliding scale  Poor prognosis  MDM: High. Qian Winters MD   [phone]      This dictation was generated by voice recognition computer software. Although all attempts are made to edit the dictation for accuracy, there may be errors in the transcription that are not intended.

## 2021-02-08 NOTE — PROGRESS NOTES
CXR complete, showing OG in correct place. Tube feed Osmolite 1.2 started at goal rate of 25 ml/hr with water flushes 150q6.

## 2021-02-09 NOTE — PROGRESS NOTES
IVPB premix, PRN      pantoprazole (PROTONIX) injection 40 mg, Daily        Objective:  BP 99/61   Pulse 71   Temp 98.2 °F (36.8 °C) (Bladder)   Resp 23   Ht 5' 7\" (1.702 m)   Wt 187 lb 2.7 oz (84.9 kg)   SpO2 94%   BMI 29.32 kg/m²     Intake/Output Summary (Last 24 hours) at 2/9/2021 1248  Last data filed at 2/9/2021 0600  Gross per 24 hour   Intake 2735.6 ml   Output 2075 ml   Net 660.6 ml      Wt Readings from Last 3 Encounters:   02/09/21 187 lb 2.7 oz (84.9 kg)   03/11/19 176 lb 4 oz (79.9 kg)   09/19/16 175 lb (79.4 kg)       General appearance:  Not seizing at the moment but seizes with any suctioning or positioning of the patient   Eyes: Sclera clear. Pupils equal.  ENT: Moist oral mucosa. Trachea midline, no adenopathy. Cardiovascular: Regular rhythm, normal S1, S2. No murmur. No edema in lower extremities  Respiratory: vented, clear. GI: Abdomen soft, no tenderness, not distended, normal bowel sounds  Musculoskeletal: No cyanosis in digits, neck supple  Neurology: Intubated  Psych: Intubated sedated  Skin: Warm, dry, normal turgor  Extremity exam shows brisk capillary refill. Peripheral pulses are palpable in lower extremities     Labs and Tests:  CBC:   Recent Labs     02/07/21  0545 02/08/21  0435 02/09/21  0555   WBC 14.6* 9.9 10.5   HGB 11.2* 10.7* 10.5*    178 217     BMP:    Recent Labs     02/07/21  0545 02/08/21  0435 02/09/21  0555    142 146*   K 3.8 4.3 3.5    108 111*   CO2 25 26 28   BUN 20 26* 22*   CREATININE 0.9 0.8 0.7*   GLUCOSE 106* 170* 123*     Hepatic:   Recent Labs     02/08/21  1145   AST 63*   ALT 41*     XR CHEST PORTABLE   Final Result   Status post readjustment of the gastric tube with the tip now in the body of   the stomach and in good position. Nonspecific gas pattern. No change in the ET tube position. Stable cardiomegaly with slowly resolving pulmonary congestion and decreasing   perihilar and bibasilar opacities.          XR CHEST PORTABLE   Final Result   Slightly improved bilateral airspace opacities. XR ABDOMEN FOR NG/OG/NE TUBE PLACEMENT   Final Result   Enteric tube tip and side port project over the stomach. XR CHEST PORTABLE   Final Result   Slightly decreased right-sided pulmonary disease but increased left-sided   pulmonary disease with new consolidative disease in the left lung base and   possible new left pleural effusion         CT CHEST WO CONTRAST   Final Result   Cervical: Degenerative disc disease. Trace anterolisthesis of T1 on T2. No   gross fracture. Chest: Diffuse bilateral heterogeneous opacity, greatest within the lower   lobes, for which leading considerations include pulmonary edema, pneumonia,   contusion, or hemorrhage. Multiple bilateral anterior rib fractures as discussed above. CT CERVICAL SPINE WO CONTRAST   Final Result   Cervical: Degenerative disc disease. Trace anterolisthesis of T1 on T2. No   gross fracture. Chest: Diffuse bilateral heterogeneous opacity, greatest within the lower   lobes, for which leading considerations include pulmonary edema, pneumonia,   contusion, or hemorrhage. Multiple bilateral anterior rib fractures as discussed above. CT HEAD WO CONTRAST   Final Result   Motion limited exam demonstrating atrophy and small vessel ischemic disease. Moderate paranasal sinus disease. XR CHEST PORTABLE   Final Result   Cardiomegaly with pulmonary edema             Discussed with ICU nurse at bedside    Problem List  Active Problems:    Hypoxic ischemic encephalopathy    New onset seizure ([facility] [address]    Acute encephalopathy    Acute respiratory failure with hypoxia (HCC)  Resolved Problems:    * No resolved hospital problems.  *       Assessment & Plan:     Status post V. fib arrest  Acute hypoxic respiratory failure  Anoxic brain injury  Seizure/ status epilepticus  Aspiration pneumonia  Acute kidney injury  Cardiogenic shock    V.fib arrest in the field without bystander CPR, down for long period of time,  leading to anoxic brain injury  He is not responding to any medications. And continues to seize  Family does not seem to understand that this is irreversible, they continue to say to \"do everything\"   We have explained that there is nothing else to do. I suspect that  I will have to attempt transfer to a place with continuous EEG. Covid testing negative  On Keppra and fosphenytoin, versed gtt, IV ativan, propofol  Neurology following   Keppra was increased to 1 g twice daily  Had Ativan, will keep as needed     cardiogenic shock. He did have elevated troponin and reduced ejection fraction. Continues to require norepinephrine and support of hypotension from cardiogenic shock. EF of  50%  No invasive cardiac procedure at this time  Continue supportive management  Serial cardiac enzyme  Echo noted as above    He was started on vancomycin and cefepime  Antibiotic was discontinued by [facility], started on Unasyn for aspiration pneumonia    Overall prognosis terrible. I discussed with children who are waiting on additional family members. He understands the poor prognosis, he will talk to the rest of the family in regards of CODE STATUS, currently we will keep patient full code. Patient family to decide further goals of care. Came back on 2/9/2021 and said they will wait 25 days  To make a decision for religous reasons. I have requested and ethics consult. IV Access: Central line  Stratton: Yes  Diet: DIET TUBE FEED CONTINUOUS/CYCLIC NPO; STANDARD WITHOUT FIBER; Orogastric; Continuous; 25; 25  Code:Full Code  DVT PPX we will hold today, noted bleeding again from ET tube. SCD  Disposition continue ICU care, family to decide further goals of care    Spoke with family member who is insisting on transfer, overall prognosis is very guarded.   MODE has  Reviewed the chart and refused transfer twice and Lisseth reviewed and has also refused. Prognosis: Very guarded      Due to the immediate potential for life-threatening deterioration due to status post cardiac arrest, I spent 35 minutes providing critical care. This time is excluding time spent performing procedures.       Nila Dykes MD   2/9/2021 12:48 PM

## 2021-02-09 NOTE — PROGRESS NOTES
Family is requesting transfer to [facility] for second opinion and additional care. I spoke with the transfer center and  is not accepting patients based on patient and family preference. Based on this I discussed with patient's family members about a possible transfer to [facility] as continuous EEG monitoring can be done there as well. I spoke to the transfer center and was put through to intensivist Dr. Ariana Deleon. [facility] does not have any open ICU beds at this time. Additionally the continuous EEG monitoring was made for their neurosurgery program for monitoring status epilepticus that has not occurred secondary to MI and prolonged downtime. Based on no bed availability at [facility] ICU have reached out to the transfer center again to see if  is able to accept the patient now that transfer within my own system is not possible. Unfortunately I do not think that continuous EEG monitoring will affect the outcome in this situation of V. fib arrest in the field without bystander CPR and prolonged downtime leading to anoxic brain injury. I received a return call from the transfer center and was put in contact with the neuro intensivist from [facility]. On discussion with the neuro intensivist determined that this type of transfer for a second opinion is the lowest priority where in the past second opinion transfers would have been very easy to arrange because of Covid things are much more difficult to arrange. He is going to check on the bed status for neuro ICU, and if they are able to accept the patient they will call me back in about an hour. They wanted to make it clear that this transfer should it take place is non-emergent. Addendum:  I did get a call back from the transfer center and they have declined transfer to  a second time.

## 2021-02-09 NOTE — PROGRESS NOTES
Shift assessment complete. VSS. Patient's family is at bedside currently. Family received updates at bedside, questions answered to the fullest extent. Patient presents this morning as intubated and sedated, refer to [facility] and flowsheets for further details. Patient is noted to have intermittent visible seizure activity, PRN Ativan given as needed. Patient's pupils are non-reactive to light, and corneal reflexes are not present. Gag and cough are noted to be weak upon ETT suctioning. RASS is a -5 and CPOT is 0. Central line is infusing without difficulty, and OGT infusing tube feeds without incident. Safety precautions in place, will continue to monitor.

## 2021-02-09 NOTE — PROGRESS NOTES
Seizing upon entering room for initial assessment. Started 1936 and ended at almost 2100. Scheduled Keppra and Phenytoin ordered throughout night. Next PRN Ativan dose not due yet. Per report, this is his 10th seizure today.

## 2021-02-09 NOTE — PROGRESS NOTES
Libertad Gallardo  Neurology Follow-up  [facility] Neurology    Date of Service: 2/9/2021    Subjective:   CC: Follow up today regarding: Anoxic brain insult and seizure. Events noted. Chart and lab reviewed. No major changes overnight. Family is now around today. Continues to have intermittent seizure which could be prolonged. He is on multiple sedatives including propofol and Versed in addition to fosphenytoin and Keppra. Blood test reviewed. Sodium 146 today. Elevated LFT. Dilantin level is low and Keppra is 29. Other review of system was unremarkable. .      ROS: limited due to intubation        Family history: Noncontributory      Past Medical History:   Diagnosis Date    GERD (gastroesophageal reflux disease)     Hypertension     Kidney stone      Current Facility-Administered Medications   Medication Dose Route Frequency Provider Last Rate Last Admin    LORazepam (ATIVAN) injection 5 mg  5 mg Intravenous Q4H PRN Serena Schmitt MD   5 mg at 02/09/21 0426    acetaminophen (TYLENOL) tablet 650 mg  650 mg Oral Q4H PRN Jed Marley MD   650 mg at 02/09/21 0543    albuterol sulfate  (90 Base) MCG/ACT inhaler 6 puff  6 puff Inhalation Q4H Kiki Poole MD   6 puff at 02/09/21 0359    norepinephrine (LEVOPHED) 16 mg in dextrose 5% 250 mL infusion  2-100 mcg/min Intravenous Continuous Kiki Poole MD   Stopped at 02/08/21 1830    midazolam (VERSED) 100 mg in dextrose 5 % 100 mL infusion  1-10 mg/hr Intravenous Continuous Debbi Menjivar MD 10 mL/hr at 02/09/21 0718 10 mg/hr at 02/09/21 0718    levetiracetam (KEPPRA) 1000 mg/100 mL IVPB  1,000 mg Intravenous Q12H Kiki Poole MD   Stopped at 02/09/21 0751    fosphenytoin (CEREBYX) 100 mg PE in dextrose 5 % 50 mL IVPB (maintenance dose)  2.5 mg PE/kg/day Intravenous Q12H Debbi Menjivar MD   Stopped at 02/09/21 0238    acetaminophen (TYLENOL) suppository 650 mg  650 mg Rectal Q4H PRN Lili Weaver MD   650 mg at 02/07/21 4750 No  Cough: No  Gag: Yes  Spontaneous breathing: Yes  II: Pupils: Small but reactive  III,IV,VI: No gaze preference. VII: Face is symmetric   Musculoskeletal: no motor response  Exam unchanged today. Data:  LABS:   Lab Results   Component Value Date     02/09/2021    K 3.5 02/09/2021    K 4.4 02/04/2021     02/09/2021    CO2 28 02/09/2021    BUN 22 02/09/2021    CREATININE 0.7 02/09/2021    GFRAA >60 02/09/2021    LABGLOM >60 02/09/2021    GLUCOSE 123 02/09/2021    MG 2.50 02/03/2021    CALCIUM 7.8 02/09/2021     Lab Results   Component Value Date    WBC 10.5 02/09/2021    RBC 3.76 02/09/2021    HGB 10.5 02/09/2021    HCT 32.5 02/09/2021    MCV 86.4 02/09/2021    RDW 13.4 02/09/2021     02/09/2021     Lab Results   Component Value Date    INR 1.14 02/03/2021    PROTIME 13.2 02/03/2021       Reviewed recent labs  Reviewed notes from different physicians. Impression: The same  Acute encephalopathy, status post cardiopulmonary arrest.  Severe. Concern for severe anoxic injury  Post anoxic status epilepticus, not controlled despite being on multiple sedatives and AED. Likely secondary to severe anoxic brain insult. Very poor prognosis. Acute respiratory failure with hypoxia  Septic shock  V. fib arrest  Aspiration pneumonia  Cardiogenic shock  Elevated troponin         Recommendation    Discussed with ICU team today  Exam is about the same. Still with recurrent seizure despite being on multiple AED and sedatives  Concern for severe anoxic brain insult and poor prognosis  Family is still debating about the decision regarding future care or consideration for terminal extubation and comfort measures. If family continues to request full code, he will need to be on phenobarb level and penta, which will need continuous EEG recording.   I do not feel it would change the outcome with possibility of vegetative state  Continue current supportive care  Awaiting family's decision  Continue antibiotics  Nothing to add from neurology at this point  Please call for questions  We will follow as needed for now  MDM: Benitez Fatima MD   [phone]      This dictation was generated by voice recognition computer software. Although all attempts are made to edit the dictation for accuracy, there may be errors in the transcription that are not intended.

## 2021-02-09 NOTE — PROGRESS NOTES
respiratory failure  Anoxic brain injury  Seizure/ status epilepticus  Aspiration pneumonia  Acute kidney injury  Cardiogenic shock    Continue ICU   Critical care on board    He is not responding to any medications. And continues to seize  Family does not seem to understand that this is irreversible, they continue to say to \"do everything\"   We have explained that there is nothing else to do. I suspect that will have to attempt transfer to a place with continuous EEG. Covid testing negative  On Keppra and fosphenytoin, versed gtt, IV ativan,  Neurology following   Versed was added to propofol  Keppra was increased to 1 g twice daily  Concern added Versed to propofol  Had Ativan, will keep as needed    Echo shows abnormal septal wall motion cannot exclude additional regional changes  Borderline systolic function with EF 50%  No invasive cardiac procedure at this time  Continue supportive management  Serial cardiac enzyme  Echo noted as above    He was started on vancomycin and cefepime  Antibiotic was discontinued by [facility], started on Unasyn for aspiration pneumonia    Overall prognosis terrible. I discussed with children who are waiting on additional family members. He understand the poor prognosis, he will talk to the rest of the family in regards of CODE STATUS, currently we will keep patient full code. Patient family to decide further goals of care. IV Access: Central line  Stratton: Yes  Diet: DIET TUBE FEED CONTINUOUS/CYCLIC NPO; STANDARD WITHOUT FIBER; Orogastric; Continuous; 25; 25  Code:Full Code  DVT PPX we will hold today, noted bleeding again from ET tube. SCD  Disposition continue ICU care, family to decide further goals of care    Spoke with patient's son at bedside, overall prognosis is very guarded    Prognosis: Very guarded      Due to the immediate potential for life-threatening deterioration due to status post cardiac arrest, I spent 35 minutes providing critical care.   This time is excluding time spent performing procedures.       Irina Wisdom MD   2/9/2021 7:36 AM

## 2021-02-09 NOTE — PROGRESS NOTES
Throughout the day, patient was noted to have five incidents of seizure activity. Seizures time spans ranged from anywhere from five to twenty minutes in length. Doctors aware and monitoring situation.

## 2021-02-09 NOTE — PROGRESS NOTES
LDAs: Patient is on mechanical ventilation. #7 ETT to 26 cm at the lip. Settings: AC/PC R 23 / P 15 /  fiO2 50% / PEEP 5. OGT to 56 cm at the lip. Osmolite 1.2 TF continuously infusing at goal rate of 25 mL/hr with 150 mL of free water flushes q 6 hrs. R triple lumen fem CVC continuously infusing per order. See MAR. L AC and L FA PIVs normal saline locked. Stratton patent with tea/green urine. Assessment: Sedated for maintenance of seizure activity. Unresponsive. RASS -5. Unable to follow commands. NSR/ST. Weak gag. Pupils non reactive. Abdomen round and distended with hypoactive bowel sounds in all four quadrants. Febrile; tylenol given. Skin diaphoretic and intact. No evidence of edema. Previously on pressor support; will monitor for hypotension. Repositioned for comfort and the prevention of pressure ulcer formation.

## 2021-02-09 NOTE — PROGRESS NOTES
Patient accompanied down to CT with RN, RT and transporter present. No acute events noted, and patient returned to room without incident. Bed set to the lowest precautions, safety precautions in place. Will continue to monitor.

## 2021-02-09 NOTE — PROGRESS NOTES
[facility] Pulmonary and Critical Care    Follow Up Note    Subjective:   CHIEF COMPLAINT / HPI:   Chief Complaint   Patient presents with    Cardiac Arrest     Pt found down by family and into cardiac arrest when squad arrived. Pt in vfib 2 shocks delivered and then tamponade. 300 amiodarone given and sodium bicarb. Epi x5 given by squad. Virgil device in place upon arrival, PEA     Interval history: Patient originally admitted 2/3/2021 after cardiac arrest.  He was found down at home by family. EMS found him in V. fib. He was transported to ED where he remained in cardiac arrest.  He had prolonged resuscitation prior to [facility] 1772. Since resuscitation in ICU he has had recurrent seizures. Past Medical History:    Reviewed; no changes    Social History:    Reviewed; no changes    REVIEW OF SYSTEMS:    ROS is unobtainable due to his critical illness. Objective:   PHYSICAL EXAM:        VITALS:  BP (!) 97/56   Pulse 74   Temp 98.2 °F (36.8 °C) (Bladder)   Resp 23   Ht 5' 7\" (1.702 m)   Wt 187 lb 2.7 oz (84.9 kg)   SpO2 93%   BMI 29.32 kg/m²  on mechanical ventilation     24HR INTAKE/OUTPUT:      Intake/Output Summary (Last 24 hours) at 2/9/2021 1002  Last data filed at 2/9/2021 0600  Gross per 24 hour   Intake 2735.6 ml   Output 2075 ml   Net 660.6 ml       CONSTITUTIONAL: Nonresponsive on propofol, fentanyl, Keppra and fosphenytoin. Frequent seizure activity as described but he is not seizing at the time of my visit. LUNGS:  No increased work of breathing and clear to auscultation, no crackles or wheezes  CARDIOVASCULAR: S1 and S2 and no JVD  ABDOMEN:  normal bowel sounds, non-distended and non-tender to palpation  EXT: No edema, no calf tenderness. Pulses are present bilaterally.   NEUROLOGIC: Nonresponsive  SKIN:  normal skin color, texture, turgor, no redness, warmth, or swelling at IV sites    DATA:    CBC:  Recent Labs     02/07/21  0545 02/08/21  0435 02/09/21  0555   WBC 14.6* 9.9 10.5   RBC show pulmonary edema with increased density in the lower lobe. CT imaging was suggestive of potential aspiration. He continues to receive Unasyn for this. He is afebrile and leukocytosis has resolved. No guiding culture data. Anticipate 7-day course of antibiotics. Patient did initially have some bleeding from the endotracheal tube. It was for this reason that hypothermia was not employed. Bleeding has stopped. The patient is not on anticoagulation. He is receiving Protonix for GI prophylaxis. Prognosis for functional recovery is poor now 5 days into his hospitalization. Family is discussing options. Terminal extubation would be reasonable. Total critical care time caring for this patient with life threatening illness, including direct patient contact, management of life support systems, review of data including imaging and labs, discussions with other team members and physicians is at least 32 minutes so far today, excluding procedures.

## 2021-02-09 NOTE — PROGRESS NOTES
02/08/21 2029   Vent Patient Data   Plateau Pressure 14 TFW12   Static Compliance 23 mL/cmH2O   Dynamic Compliance 51.62 mL/cmH2O

## 2021-02-10 NOTE — PLAN OF CARE
Called by staff earlier regarding family request to speak to me and requesting transfer     Had earlier received call from radiology about abnormal CT head done in evening   Chart reviewed     CT H shows   Impression:        Loss of gray-white matter differentiation bilaterally.  Cytotoxic edema is   considered.  Global hypoxic injury versus postictal state. Acute or chronic sinusitis of the paranasal sinuses is again noted. Bilateral mastoid effusions. Critical results were called by Dr. Delmar Mccall MD to Jose Lucero   on 2/9/2021 at 19:32. Chart reviewed   Two sons  @bedside     Case d/w them and answered questions and explained poor prognosis to them in terms of worsening Imaging and anoxic Brain Injury and continuous Sz despite aggressive RX post cardiac arrest . And also tried to explain the recommendations and opinion per neurology/ICU team in AM     Per their request , I called transfer centre again and explained situation to them and about family requesting patient to be Transferred to      Received call back from Transfer centre and they told me that this particular case has been discussed by Dr Romario Hayden multiple times earlier w/.  and [facility] ICU Physicians both. Per Transfer centre RN ,  is not accepting any patient to its ICU unless they need Surgical Interventions done or Procedures done . And This case already has Poor prognosis and hence the likelihood of them accepting this patient as a transfer to utilize an ICU bed is low. I requested them to check [facility] ICU for [facility] and they looked at the current status and [facility] ICU beds are filled up as well. I came and discussed w/ family and updated them as well. I told them That if they do find a ICU physician who is ready to accept The patient as a Transfer to Their ICU , I will gladly Transfer the patient and give the report to that ICU physician for accepting care of the patient .  They will be making efforts on their end as well. I will pass along to Dr Farida Rosas to discuss w/ Family in AM and continue making efforts per Family request .     Margaret Noriega MD    2/10/2021 1:46 AM    [facility].

## 2021-02-10 NOTE — PROGRESS NOTES
Introduced self to patient. Initial shift assessment completed, see complex assessment in doc flowsheet. Pt has seizure with repositioning. Pt remains on propofol, fentanyl, and versed. Pt is also receiving PRN ativan. Pt has ETT, OG, CVC, and rodriguez in place. VSS on current support.

## 2021-02-10 NOTE — PROGRESS NOTES
[facility]    Follow Up Note    Subjective:   CHIEF COMPLAINT / HPI:   Chief Complaint   Patient presents with    Cardiac Arrest     Pt found down by family and into cardiac arrest when squad arrived. Pt in vfib 2 shocks delivered and then tamponade. 300 amiodarone given and sodium bicarb. Epi x5 given by squad. Virgil device in place upon arrival, PEA     Interval history: Patient originally admitted 2/3/2021 after cardiac arrest.  He was found down at home by family. EMS found him in V. fib. He was transported to ED where he remained in cardiac arrest.  [facility] Since resuscitation in ICU he has had recurrent seizures. Patient continues to demonstrate seizure activity with almost any stimulation. This is well documented by nursing another physician notes as well. Family is not excepting of the patient's condition/prognosis. They have requested transfer to another facility. Hospitalists have made efforts to transfer the patient but  and [facility] have been unable to accept the patient. Past Medical History:    Reviewed; no changes    Social History:    Reviewed; no changes    REVIEW OF SYSTEMS:    ROS is unobtainable due to his critical illness. Objective:   PHYSICAL EXAM:        VITALS:  BP (!) 103/55   Pulse 80   Temp 99.8 °F (37.7 °C) (Core)   Resp 23   Ht 5' 7\" (1.702 m)   Wt 188 lb 11.4 oz (85.6 kg)   SpO2 96%   BMI 29.56 kg/m²  on mechanical ventilation     24HR INTAKE/OUTPUT:      Intake/Output Summary (Last 24 hours) at 2/10/2021 0926  Last data filed at 2/10/2021 0529  Gross per 24 hour   Intake 2478.1 ml   Output 1250 ml   Net 1228.1 ml       CONSTITUTIONAL: Nonresponsive on propofol, fentanyl, Keppra and fosphenytoin. Frequent seizure activity as described but he is not seizing at the time of my visit.     LUNGS:  No increased work of breathing and clear to auscultation, no crackles or wheezes  CARDIOVASCULAR: S1 and S2 and no JVD  ABDOMEN:  normal bowel sounds, non-distended and non-tender to palpation  EXT: No edema, no calf tenderness. Pulses are present bilaterally. NEUROLOGIC: Nonresponsive  SKIN:  normal skin color, texture, turgor, no redness, warmth, or swelling at IV sites    DATA:    CBC:  Recent Labs     02/08/21 0435 02/09/21  0555 02/10/21  0533   WBC 9.9 10.5 11.9*   RBC 3.70* 3.76* 3.79*   HGB 10.7* 10.5* 10.5*   HCT 32.0* 32.5* 32.6*    217 257   MCV 86.4 86.4 86.0   MCH 29.0 27.9 27.8   MCHC 33.5 32.3 32.4   RDW 13.5 13.4 13.3      BMP:  Recent Labs     02/08/21 0435 02/09/21  0555 02/10/21  0533    146* 147*   K 4.3 3.5 3.8    111* 111*   CO2 26 28 29   BUN 26* 22* 17   CREATININE 0.8 0.7* 0.6*   CALCIUM 8.0* 7.8* 8.0*   GLUCOSE 170* 123* 125*      ABG:  Recent Labs     02/08/21 0437   PHART 7.388   ONS1XEP 46.5*   PO2ART 91.9   TAB4IZM 28.0   H1DEPARE 97.7   BEART 2.5       Cultures:    Abx:    Radiology Review:  Pertinent images / reports were reviewed as a part of this visit. Assessment:     1. Status post cardiopulmonary arrest with ROSC  2. Acute hypercapnic/hypoxemic respiratory failure  3. Severe anoxic encephalopathy  4. Aspiration pneumonia  5. Cardiogenic shock    I have reviewed laboratories, medical records and images for this visit    Patient appears to have profound anoxic encephalopathy following likely arrhythmic arrest with prolonged resuscitation before obtaining ROSC. He has had both gross clinical seizure activity and seizure activity on EEG. He is on propofol, fentanyl, Keppra and Dilantin. Still having breakthrough seizures. CT of the head was repeated yesterday and revealed loss of gray-white matter differentiation consistent with hypoxic injury. Neurology is also following. Hemodynamics have stabilized and the patient is no longer requiring norepinephrine. He did have troponin elevation on admission.   Echocardiogram revealed LVEF approximately 50% with abnormal septal wall motion. There is also indeterminate diastolic function. Patient continues to require mechanical ventilation mostly because of his neurologic status. I have reviewed and adjusted the ventilator at the bedside. Current settings are AC/PC with tidal volume 400, rate 24, FiO2 0.5 and PEEP 5. No new ABG today. Chest x-ray from 2 7 revealed mild vascular congestion. He has +4 L for the admission. Renal function is normal.  Give him a dose of Lasix and repeat his chest x-ray. CT imaging was suggestive of potential aspiration. He is on day 6 of Unasyn. Anticipate a 7-day course of antibiotics. He is afebrile and leukocytosis has resolved. No guiding culture data. .    Patient did initially have some bleeding from the endotracheal tube. It was for this reason that hypothermia was not employed. Bleeding has stopped. The patient is not on anticoagulation. He is receiving Protonix for GI prophylaxis. Prognosis for functional recovery is poor now 6 days into his hospitalization. Family is struggling with his diagnosis and prognosis. At this point, their goal is to have him transferred to another facility. Total critical care time caring for this patient with life threatening illness, including direct patient contact, management of life support systems, review of data including imaging and labs, discussions with other team members and physicians is at least 32 minutes so far today, excluding procedures. 185.42

## 2021-02-10 NOTE — PROGRESS NOTES
[id number] assessments:    +3 pitting edema in BUE. +1 pitting in BLE. One son slept at bedside and left around 0515. Because he believes that the day shift lied about amount of seizures that occurred yesterday, I made sure to point each one out and  document their times on the board for him; 1917, [facility], [address], [address] [facility], and [facility]. Patient seizes with almost all stimuli, but definitely with each turn. CHG bath. Chlorhexidine wipes, lotion, powder, deodorant. New bed pad, gown, sacrum heart border, EKG tabs, and pillow cases.

## 2021-02-10 NOTE — PROGRESS NOTES
[facility] PROGRESS NOTE    2/10/2021 3:43 PM        Name: Lynne Garcia . Admitted: 2/3/2021  Primary Care Provider: Bryce Hdz MD (Tel: [phone])    Brief Course:   Patient is 65years old male with past medical history significant for hypertension GERD who presented after suffering cardiac arrest, patient was found down unresponsive by his son, CPR was not initiated, on EMS arrival he was found to be in V. fib arrest, patient was defibrillated, amiodarone and epinephrine with 5 rounds of CPR, in ER he continued to DNR cardiac arrest ROSC was not achieved for prolonged period of time. He was admitted to ICU for further evaluation is currently intubated and sedated    CC: Status post cardiac arrest    Subjective:     Status epilepticus  He is on 6 meds for anti-seizures yet remains seizing with any type of stimulation.         Reviewed interval ancillary notes    Current Medications      enoxaparin (LOVENOX) injection 40 mg, Daily      LORazepam (ATIVAN) injection 5 mg, Q4H PRN      acetaminophen (TYLENOL) tablet 650 mg, Q4H PRN      albuterol sulfate  (90 Base) MCG/ACT inhaler 6 puff, Q4H      midazolam (VERSED) 100 mg in dextrose 5 % 100 mL infusion, Continuous      levetiracetam (KEPPRA) 1000 mg/100 mL IVPB, Q12H      fosphenytoin (CEREBYX) 100 mg PE in dextrose 5 % 50 mL IVPB (maintenance dose), Q12H      acetaminophen (TYLENOL) suppository 650 mg, Q4H PRN      fentaNYL 10 mcg/mL infusion, Continuous      ampicillin-sulbactam (UNASYN) 1500 mg IVPB minibag, Q6H      propofol injection, Titrated      sodium chloride flush 0.9 % injection 10 mL, 2 times per day      sodium chloride flush 0.9 % injection 10 mL, PRN      promethazine (PHENERGAN) tablet 12.5 mg, Q6H PRN    Or      ondansetron (ZOFRAN) injection 4 mg, Q6H PRN      potassium chloride 10 mEq/100 mL IVPB (Peripheral results were called by Dr. Rashi Lizama MD to Kimberly Tracie   on 2/9/2021 at 19:32. RECOMMENDATIONS:   When feasible follow-up MRI would be helpful. XR CHEST PORTABLE   Final Result   Status post readjustment of the gastric tube with the tip now in the body of   the stomach and in good position. Nonspecific gas pattern. No change in the ET tube position. Stable cardiomegaly with slowly resolving pulmonary congestion and decreasing   perihilar and bibasilar opacities. XR CHEST PORTABLE   Final Result   Slightly improved bilateral airspace opacities. XR ABDOMEN FOR NG/OG/NE TUBE PLACEMENT   Final Result   Enteric tube tip and side port project over the stomach. XR CHEST PORTABLE   Final Result   Slightly decreased right-sided pulmonary disease but increased left-sided   pulmonary disease with new consolidative disease in the left lung base and   possible new left pleural effusion         CT CHEST WO CONTRAST   Final Result   Cervical: Degenerative disc disease. Trace anterolisthesis of T1 on T2. No   gross fracture. Chest: Diffuse bilateral heterogeneous opacity, greatest within the lower   lobes, for which leading considerations include pulmonary edema, pneumonia,   contusion, or hemorrhage. Multiple bilateral anterior rib fractures as discussed above. CT CERVICAL SPINE WO CONTRAST   Final Result   Cervical: Degenerative disc disease. Trace anterolisthesis of T1 on T2. No   gross fracture. Chest: Diffuse bilateral heterogeneous opacity, greatest within the lower   lobes, for which leading considerations include pulmonary edema, pneumonia,   contusion, or hemorrhage. Multiple bilateral anterior rib fractures as discussed above. CT HEAD WO CONTRAST   Final Result   Motion limited exam demonstrating atrophy and small vessel ischemic disease. Moderate paranasal sinus disease.          XR CHEST PORTABLE   Final Result Cardiomegaly with pulmonary edema         XR CHEST PORTABLE    (Results Pending)       Discussed with ICU nurse at bedside    Problem List  Active Problems:    Hypoxic ischemic encephalopathy    New onset seizure ([address]    Acute encephalopathy    Acute respiratory failure with hypoxia (HCC)  Resolved Problems:    * No resolved hospital problems. *       Assessment & Plan:     Status post V. fib arrest  Acute hypoxic respiratory failure  Anoxic brain injury  Seizure/ status epilepticus  Aspiration pneumonia  Acute kidney injury  Cardiogenic shock    V.fib arrest in the field without bystander CPR, down for long period of time,  leading to anoxic brain injury  He is not responding to any medications. And continues to seize  Family does not seem to understand that this is irreversible, they continue to say to \"do everything\"   We have explained that there is nothing else to do. I suspect that  I will have to attempt transfer to a place with continuous EEG. Covid testing negative  On Keppra and fosphenytoin, versed gtt, IV ativan, propofol  Neurology following   Keppra was increased to 1 g twice daily  Had Ativan, will keep as needed  ET scan done yesterday of the brain demonstrates the lack of differentiation between gray and white matter which is consistent with severe anoxic brain injury additionally there is noted toxic vasogenic edema. cardiogenic shock. He did have elevated troponin and reduced ejection fraction. Continues to require norepinephrine and support of hypotension from cardiogenic shock. EF of  50%  No invasive cardiac procedure at this time  Continue supportive management  Serial cardiac enzyme  Echo noted as above    He was started on vancomycin and cefepime  Antibiotic was discontinued by [facility], started on Unasyn for aspiration pneumonia    Overall prognosis terrible. I discussed with children who are waiting on additional family members.    He understands the poor prognosis, he will talk to

## 2021-02-10 NOTE — CONSULTS
time:2/10/2021 1700                            Consultation participants (list all including name and relationship to patient):            · Ivon Banda RN, BSN, [facility] chair of ethics  · Michael Spaulding regional ethics director  · Dr Laurie Dhillon  · Aleida Mejia RN    Summary of ethical analysis and moral deliberation: It is ethically unjustifiable to prolong this patients suffering. There is a cultural component of the 25 days. Recommendations for action:Inform family that at this point we are not going to stop current treatment but that we are not going to escalate care which means DNR status. Allowing them time to accept. Next step would be to talk with Risk management. yes    Identify appropriate decision maker    Does the patient possess decision-making capacity? no  Does patient have Advance Directives? no    Primary surrogate name: Edward snell       Explain the Synthesis    Describe how the results of the ethics consultation were communicated/explained to the key participant(s), and how these results were received: confirmed the 25 days is a cultural component discussed the feasibility all 12 children will agree. ·     Support the Consultation Process    Follow-up with consult requestor and wilder participants.

## 2021-02-10 NOTE — PROGRESS NOTES
[facility] or Facility: [facility]   From: Emely Cotnreras   RE: Michell Polanco 1950 RM: [id number]     The patient found down at home. In Vfib when squad arrived. Intubated and to us. Nonresponsive on arrival to present. Seizing constantly. Yesterday had about 10 episodes. 1st episode that I witnessed yesterday lasted over an hour and a half. Right now very obviously seizing going on an hour now. Family does not believe we are \"caring\" for patient. Family told that Propofol, fentanyl, and versed is maxed. They wanted meds turned up. Showed them in the computer that Propofol's max is 50, but we have him at 60 for seizure control and cannot go up more. Showed them that Versed is maxed at 10 as well. Patient receives scheduled keppra and phenytoin + PRN Ativan. Today they wanted him transferred out. UC denied, Yazidism denied, UC denied again. One son was here all day and said that dayshift is lying about 5 seizing episodes. 2 sons here now and very upset. They want to talk to physician face to face immediately. Think that that Vijay's note about UC denial is fake and want real paper confirmation. Do not believe Denita Slater or Alona Elmore explanations either. Please come to bedside. I would REALLY appreciate it. Explained in all the ways that I could.

## 2021-02-10 NOTE — PROGRESS NOTES
Dr. Shannen Jackson to bedside per patient family request. See other Progress Note for [employer] content. Family very upset. Believes we should have transferred patient out on 1st or 2nd day. MD and RN both explained the treatment of care + prognosis, but they are under the impression that a procedure or something else should be done, although they are unable to specify what they believe that should be. Says that they have been told conflicting information by each doctor. For example, one doctor said the patient could like an minutes to an hour off the vent and another doctor said it could be 24 hours. Miscommunication. Using words \"harrassed\" and \"drama\" repetitively about experience since admission. LDAs: Patient is on mechanical ventilation. #7 ETT to 26 cm at the lip. Settings: AC/PC R 23 / P 15 /  fiO2 50% / PEEP 5. OGT to 56 cm at the lip. Osmolite 1.2 TF continuously infusing at goal rate of 25 mL/hr with 150 mL of free water flushes q 6 hrs. R triple lumen fem CVC continuously infusing per order. See MAR. L AC and L FA PIVs normal saline locked. Stratton patent with tea/green urine. Assessment: Sedated for maintenance of seizure activity. Seizing upon entering room at 1917. PRN not due yet. Unresponsive. RASS -5. NSR. Weak gag. Pupils non reactive. Abdomen round and distended with hypoactive bowel sounds in all four quadrants. Febrile; tylenol given. Diaphoretic. Circular abrasions in middle of chest from MARIANELA compression machine used by bob. Otherwise, skin intact. Repositioned for comfort and the prevention of pressure ulcer formation.

## 2021-02-11 NOTE — PROGRESS NOTES
A lot of thick, creamy, tan-yellow secretions suctioned orally and endotracheally. Appearance of TF. TF stopped. MD informed. KUMATILDA ordered.

## 2021-02-11 NOTE — PROGRESS NOTES
25 [facility] or Facility: [facility]   From: Emely Contreras   RE: Michell Polanco 1950 RM: [id number]     The patient's son is at bedside again tonight. Patient's sclera and lower eye \"lid\" very edematous. The lower eye lid is twitching and patient believes he is blinking and that the scleral edema is \"crust\". Does not believe me and wants to talk to you face-to-face. I tried explaining that a twitch could even be a result from the seizing but that it is not the patient trying to \"wake up\".

## 2021-02-11 NOTE — PROGRESS NOTES
BRIEF NUTRITION NOTE    Pt remains intubated and sedated. Propofol infusing at 30.4 mL per hour to provide 803 calories from fat daily. Pt not requiring pressor support. Pt has continued with Osmolite 1.2 at 25 mL per hour. RN reports pt with tube feeds in mouth today. Pt with smear BM 2/10. Dr. Ruba Magana to add bowel regimen today and requests tube feeds be decreased to 10 mL per hour at this time. RD will evaluate tomorrow for potential for rate advancement to better meet nutrition needs.     Electronically signed by Felicity Purcell RD, LD on 2/11/2021 at 9:20 AM     Contact: [phone]

## 2021-02-11 NOTE — PROGRESS NOTES
LDAs: Patient is on mechanical ventilation. #7 ETT to 26 cm at the lip. Settings: AC/PC R 22 / P 15 /  fiO2 50% / PEEP 5. OGT to 56 cm at the lip. Osmolite 1.2 TF continuously infusing at goal rate of 25 mL/hr with 150 mL of free water flushes q 6 hrs. R triple lumen fem CVC continuously infusing per order. See MAR. L AC and L FA PIVs normal saline locked. Stratton patent with green urine.      Assessment: Sedated for maintenance of seizure activity. Unresponsive. RASS -5. NSR. Weak gag. Pupils non reactive. Abdomen round and distended with hypoactive bowel sounds in all four quadrants. Febrile; tylenol given. Diaphoretic. +2 pitting edema in BUE. +1 pitting edema in BLE. Sclera yellow with moderate edema. L eye cloudy. Teeth loose; some fell out in previous days in denture cup on counter. Circular abrasions in middle of chest from MARIANELA compression machine used by squad. Scattered abrasions from initial fall during cardiac arrest. Repositioned for comfort and the prevention of pressure ulcer formation.

## 2021-02-11 NOTE — PROGRESS NOTES
[facility] PROGRESS NOTE    2/11/2021 5:31 PM        Name: Michell Polanco . Admitted: 2/3/2021  Primary Care Provider: Arlin Johnson MD (Tel: [phone])    Brief Course:   Patient is 65years old male with past medical history significant for hypertension GERD who presented after suffering cardiac arrest, patient was found down unresponsive by his son, CPR was not initiated, on EMS arrival he was found to be in V. fib arrest, patient was defibrillated, amiodarone and epinephrine with 5 rounds of CPR, in ER he continued to DNR cardiac arrest ROSC was not achieved for prolonged period of time.     He was admitted to ICU for further evaluation is currently intubated and sedated    CC: Status post cardiac arrest    Subjective:   Unchanged remains intubated and sedated multiple medications to prevent seizure activity        Reviewed interval ancillary notes    Current Medications      senna (SENOKOT) tablet 8.6 mg, Nightly      docusate (COLACE) 50 MG/5ML liquid 100 mg, Daily      enoxaparin (LOVENOX) injection 40 mg, Daily      LORazepam (ATIVAN) injection 5 mg, Q4H PRN      acetaminophen (TYLENOL) tablet 650 mg, Q4H PRN      albuterol sulfate  (90 Base) MCG/ACT inhaler 6 puff, Q4H      midazolam (VERSED) 100 mg in dextrose 5 % 100 mL infusion, Continuous      levetiracetam (KEPPRA) 1000 mg/100 mL IVPB, Q12H      fosphenytoin (CEREBYX) 100 mg PE in dextrose 5 % 50 mL IVPB (maintenance dose), Q12H      acetaminophen (TYLENOL) suppository 650 mg, Q4H PRN      fentaNYL 10 mcg/mL infusion, Continuous      propofol injection, Titrated      sodium chloride flush 0.9 % injection 10 mL, 2 times per day      sodium chloride flush 0.9 % injection 10 mL, PRN      promethazine (PHENERGAN) tablet 12.5 mg, Q6H PRN    Or      ondansetron (ZOFRAN) injection 4 mg, Q6H PRN      potassium chloride 10 mEq/100 mL IVPB (Peripheral Line), PRN      magnesium sulfate 2000 mg in 50 mL IVPB premix, PRN      pantoprazole (PROTONIX) injection 40 mg, Daily        Objective:  /67   Pulse 84   Temp 100.3 °F (37.9 °C) (Core)   Resp 27   Ht 5' 7\" (1.702 m)   Wt 189 lb 9.5 oz (86 kg)   SpO2 94%   BMI 29.69 kg/m²     Intake/Output Summary (Last 24 hours) at 2/11/2021 1731  Last data filed at 2/11/2021 0535  Gross per 24 hour   Intake 2772 ml   Output 900 ml   Net 1872 ml      Wt Readings from Last 3 Encounters:   02/11/21 189 lb 9.5 oz (86 kg)   03/11/19 176 lb 4 oz (79.9 kg)   09/19/16 175 lb (79.4 kg)       General appearance:  Not seizing at the moment but seizes with any suctioning or positioning of the patient   Eyes: Sclera clear. Pupils equal.  ENT: Moist oral mucosa. Trachea midline, no adenopathy. Cardiovascular: Regular rhythm, normal S1, S2. No murmur. No edema in lower extremities  Respiratory: vented, clear. GI: Abdomen soft, no tenderness, not distended, normal bowel sounds  Musculoskeletal: No cyanosis in digits, neck supple  Neurology: Intubated  Psych: Intubated sedated  Skin: Warm, dry, normal turgor  Extremity exam shows brisk capillary refill. Peripheral pulses are palpable in lower extremities     Labs and Tests:  CBC:   Recent Labs     02/09/21  0555 02/10/21  0533 02/11/21  0604   WBC 10.5 11.9* 13.6*   HGB 10.5* 10.5* 11.7*    257 351     BMP:    Recent Labs     02/09/21  0555 02/10/21  0533 02/11/21  0604   * 147* 140   K 3.5 3.8 3.8   * 111* 105   CO2 28 29 27   BUN 22* 17 15   CREATININE 0.7* 0.6* 0.6*   GLUCOSE 123* 125* 111*     Hepatic:   No results for input(s): AST, ALT, ALB, BILITOT, ALKPHOS in the last 72 hours. XR ABDOMEN (KUB) (SINGLE AP VIEW)   Final Result   Nonobstructive bowel gas pattern. XR CHEST PORTABLE   Final Result   1. Stable appropriate positions of support apparatus. 2. Slight interval progression of mild CHF and bibasilar airspace disease. CT HEAD WO CONTRAST   Final Result   Loss of gray-white matter differentiation bilaterally. Cytotoxic edema is   considered. Global hypoxic injury versus postictal state. Acute or chronic sinusitis of the paranasal sinuses is again noted. Bilateral mastoid effusions. Critical results were called by Dr. Marcelo German MD to Gordon Buddy   on 2/9/2021 at 19:32. RECOMMENDATIONS:   When feasible follow-up MRI would be helpful. XR CHEST PORTABLE   Final Result   Status post readjustment of the gastric tube with the tip now in the body of   the stomach and in good position. Nonspecific gas pattern. No change in the ET tube position. Stable cardiomegaly with slowly resolving pulmonary congestion and decreasing   perihilar and bibasilar opacities. XR CHEST PORTABLE   Final Result   Slightly improved bilateral airspace opacities. XR ABDOMEN FOR NG/OG/NE TUBE PLACEMENT   Final Result   Enteric tube tip and side port project over the stomach. XR CHEST PORTABLE   Final Result   Slightly decreased right-sided pulmonary disease but increased left-sided   pulmonary disease with new consolidative disease in the left lung base and   possible new left pleural effusion         CT CHEST WO CONTRAST   Final Result   Cervical: Degenerative disc disease. Trace anterolisthesis of T1 on T2. No   gross fracture. Chest: Diffuse bilateral heterogeneous opacity, greatest within the lower   lobes, for which leading considerations include pulmonary edema, pneumonia,   contusion, or hemorrhage. Multiple bilateral anterior rib fractures as discussed above. CT CERVICAL SPINE WO CONTRAST   Final Result   Cervical: Degenerative disc disease. Trace anterolisthesis of T1 on T2. No   gross fracture.       Chest: Diffuse bilateral heterogeneous opacity, greatest within the lower   lobes, for which leading considerations include pulmonary edema, pneumonia,   contusion, or hemorrhage. Multiple bilateral anterior rib fractures as discussed above. CT HEAD WO CONTRAST   Final Result   Motion limited exam demonstrating atrophy and small vessel ischemic disease. Moderate paranasal sinus disease. XR CHEST PORTABLE   Final Result   Cardiomegaly with pulmonary edema             Discussed with ICU nurse at bedside    Problem List  Active Problems:    Hypoxic ischemic encephalopathy    New onset seizure ([facility] [address]    Acute encephalopathy    Acute respiratory failure with hypoxia (HCC)  Resolved Problems:    * No resolved hospital problems. *       Assessment & Plan:     Status post V. fib arrest  Acute hypoxic respiratory failure  Anoxic brain injury  Seizure/ status epilepticus  Aspiration pneumonia  Acute kidney injury  Cardiogenic shock    V.fib arrest in the field without bystander CPR, down for long period of time,  leading to anoxic brain injury  He is not responding to any medications. And continues to seize  Family does not seem to understand that this is irreversible, they continue to say to \"do everything\"   We have explained that there is nothing else to do. I suspect that  I will have to attempt transfer to a place with continuous EEG. Covid testing negative  On Keppra and fosphenytoin, versed gtt, IV ativan, propofol  Neurology following   Keppra was increased to 1 g twice daily  Had Ativan, will keep as needed  ET scan done yesterday of the brain demonstrates the lack of differentiation between gray and white matter which is consistent with severe anoxic brain injury additionally there is noted toxic vasogenic edema. cardiogenic shock. He did have elevated troponin and reduced ejection fraction. Continues to require norepinephrine and support of hypotension from cardiogenic shock.    EF of  50%  No invasive cardiac procedure at this time  Continue supportive management  Serial cardiac enzyme  Echo noted as above    He was started on vancomycin and cefepime  Antibiotic was discontinued by [facility], started on Unasyn for aspiration pneumonia    Overall prognosis terrible. I discussed with children who are waiting on additional family members. He understands the poor prognosis, he will talk to the rest of the family in regards of CODE STATUS, currently we will keep patient full code. Patient family to decide further goals of care. Came back on 2/9/2021 and said they will wait 25 days  To make a decision for religous reasons. I have requested and ethics consult. IV Access: Central line  [place]: Yes  Diet: DIET TUBE FEED CONTINUOUS/CYCLIC NPO; STANDARD WITHOUT FIBER; Orogastric; Continuous  Code:Full Code  DVT PPX we will hold today, noted bleeding again from ET tube. SCD  Disposition continue ICU care, family to decide further goals of care    Family has been difficult to deal with overnight they called 911 to have the patient taken to a different hospital which is not possible. He did not trust to this team and think that we are not doing anything to take care of the patient. They did not even believe that I have made phone calls to other hospitals and attempt to get the patient transferred. Prognosis: Dismal as the patient will not recover from this he CT scan demonstrates the loss of differentiation between gray and white matter consistent with severe anoxic brain injury. Additionally for cultural reasons the family does not want to make a decision about the potential withdrawal of care for 25 days. With these demands in mind as well as the patient's grim prognosis my recommendation would be to make this patient a DO NOT RESUSCITATE comfort care and then not withdrawal but also not escalate any care till the family ready.     Risk management needs to be involved in any decisions that are made family members need to be limited in their visiting status if they have caused significant issues for the nursing staff as well have been threatening. Due to the immediate potential for life-threatening deterioration due to status post cardiac arrest, I spent 35 minutes providing critical care. This time is excluding time spent performing procedures.       Radha David MD   2/11/2021 5:31 PM

## 2021-02-11 NOTE — PROGRESS NOTES
[facility]    Follow Up Note    Subjective:   CHIEF COMPLAINT / HPI:   Chief Complaint   Patient presents with    Cardiac Arrest     Pt found down by family and into cardiac arrest when squad arrived. Pt in vfib 2 shocks delivered and then tamponade. 300 amiodarone given and sodium bicarb. Epi x5 given by squad. Virgil device in place upon arrival, PEA     Interval history: Patient originally admitted 2/3/2021 after cardiac arrest.  He was found down at home by family. EMS found him in V. fib. He was transported to ED where he remained in cardiac arrest.  He had prolonged resuscitation prior to [facility]. Since resuscitation in ICU he has had recurrent seizures. Overnight no new issues. Remains on full mechanical ventilatory support. High residuals seen with tube feeding. Tube feeds currently on hold. Patient has not had any bowel movements. Patient continues to demonstrate seizure activity with almost any stimulation. This is well documented by nursing another physician notes as well. Family is not excepting of the patient's condition/prognosis. They have requested transfer to another facility. Hospitalists have made efforts to transfer the patient but  and [facility] have been unable to accept the patient. Past Medical History:    Reviewed; no changes    Social History:    Reviewed; no changes    REVIEW OF SYSTEMS:    ROS is unobtainable due to his critical illness. Objective:   PHYSICAL EXAM:        VITALS:  /67   Pulse 84   Temp 100.3 °F (37.9 °C) (Core)   Resp 27   Ht 5' 7\" (1.702 m)   Wt 189 lb 9.5 oz (86 kg)   SpO2 94%   BMI 29.69 kg/m²  on mechanical ventilation     24HR INTAKE/OUTPUT:      Intake/Output Summary (Last 24 hours) at 2/11/2021 0932  Last data filed at 2/11/2021 0535  Gross per 24 hour   Intake 2772 ml   Output 2900 ml   Net -128 ml       CONSTITUTIONAL: Nonresponsive on propofol, fentanyl, Keppra and fosphenytoin.   Patient remains heavily sedated and intubated. LUNGS:  No increased work of breathing and clear to auscultation, no crackles or wheezes  CARDIOVASCULAR: S1 and S2 and no JVD  ABDOMEN:  normal bowel sounds, non-distended and non-tender to palpation  EXT: No edema, no calf tenderness. Pulses are present bilaterally. NEUROLOGIC: Nonresponsive  SKIN:  normal skin color, texture, turgor, no redness, warmth, or swelling at IV sites    DATA:    CBC:  Recent Labs     02/09/21  0555 02/10/21  0533 02/11/21  0604   WBC 10.5 11.9* 13.6*   RBC 3.76* 3.79* 4.13*   HGB 10.5* 10.5* 11.7*   HCT 32.5* 32.6* 35.8*    257 351   MCV 86.4 86.0 86.6   MCH 27.9 27.8 28.2   MCHC 32.3 32.4 32.6   RDW 13.4 13.3 13.3   BANDSPCT  --   --  1      BMP:  Recent Labs     02/09/21  0555 02/10/21  0533 02/11/21  0604   * 147* 140   K 3.5 3.8 3.8   * 111* 105   CO2 28 29 27   BUN 22* 17 15   CREATININE 0.7* 0.6* 0.6*   CALCIUM 7.8* 8.0* 8.2*   GLUCOSE 123* 125* 111*      ABG:  No results for input(s): PHART, LLJ5MQM, PO2ART, TZW6ASN, Z5DSTXAA, BEART in the last 72 hours. Cultures:    Abx:    Radiology Review:  Pertinent images / reports were reviewed as a part of this visit. Narrative   EXAMINATION:   ONE XRAY VIEW OF THE CHEST       2/11/2021 6:43 am           FINDINGS:   Single frontal view of the chest demonstrates an endotracheal tube in   position with tip lying approximately 3.1 cm above the bonita.  There is an   NG tube coursing into the stomach.  There are small bilateral pleural   effusions, similar in comparison with the prior examination.  Heart size is   mildly enlarged, though stable.  The mediastinal contours are accentuated by   portable technique and low lung volumes, and are felt to be within normal   limits.  There is stable mild pulmonary edema.  Bibasilar airspace disease   has slightly progressed in comparison with the prior examination. [place] is   no evidence of a pneumothorax.           Impression   1.  Stable appropriate positions of support apparatus. 2. Slight interval progression of mild CHF and bibasilar airspace disease.             Assessment:     1. Status post cardiopulmonary arrest with ROSC  2. Acute hypercapnic/hypoxemic respiratory failure  3. Severe anoxic encephalopathy with recurrent seizures  4. Aspiration pneumonia  5. Cardiogenic shock    I have reviewed laboratories, medical records and images for this visit    Patient appears to have profound anoxic encephalopathy following likely arrhythmic arrest with prolonged resuscitation before obtaining ROSC. He has had both gross clinical seizure activity and seizure activity on EEG. He is on propofol, Versed, fentanyl, Keppra and Dilantin. Currently not having any seizure activity. CT of the head revealed loss of gray-white matter differentiation consistent with hypoxic injury. Neurology is on board. Suspecting very poor prognosis. Hemodynamics have stabilized and the patient is no longer requiring norepinephrine. He did have troponin elevation on admission. Echocardiogram revealed LVEF approximately 50% with abnormal septal wall motion. There is also indeterminate diastolic function. Patient continues to require mechanical ventilation mostly because of his neurologic status. Currently on AC/PC, PEEP 5: PEEP equal 20, I: E ratio 1: 2.1, FiO2 50%, respirate of 23. .  No new ABG today. Chest x-ray from 2 7 revealed mild vascular congestion. Patient did receive 1 dose of Lasix yesterday. Urine output remains adequate. Can titrate the FiO2 down to maintain SPO2 above 90%. CT imaging was suggestive of potential aspiration. He is on day 6 of Unasyn. Anticipate a 10-day course of antibiotics. He is febrile with T-max of 101.3 and persistent leukocytosis. We will send blood and respiratory cultures today. Patient did initially have some bleeding from the endotracheal tube. It was for this reason that hypothermia was not employed.   Bleeding has stopped. The patient is not on anticoagulation. He is receiving Protonix for GI prophylaxis. Last night tube feeds were held as patient was having high residuals. We will restart the tube feeds at 10 cc/h. Also start the patient on bowel regimen as he has not had bowel movements for last 6 days. Prognosis for functional recovery is poor now 7 days into his hospitalization. Family is struggling with his diagnosis and prognosis. Total critical care time caring for this patient with life threatening illness, including direct patient contact, management of life support systems, review of data including imaging and labs, discussions with other team members and physicians is at least 33 minutes so far today, excluding procedures.         Surjit Cox MD   Pulmonary Critical Care and Sleep Medicine  [place] [address], [place] Toro, [address]  2/3/2021, 9:42 AM

## 2021-02-11 NOTE — PROGRESS NOTES
Son at bedside and updated. Asked about any improvement. When I explained that the patient was still frequently seizing and was not showing any improvement,  he said that the family wanted to see how things were going to go for a few more days before deciding anything. He then  asked me if [facility] and  got back to us about transfer. I reminded him how Dr. Siva Raymond personally told him last night that both hospitals denied transfer. He disregarded this comment. Patient's lower part of eye started twitching and son became excited and calling his family members on FaceTime to show them that the patient was \"blinking\". I tried to explain that it was twitching and involuntary, but the patient did not believe me. I explained that it's like a muscle cramp/sienna horse and the spasms are happening without him purposefully making them occur. He stated that it was untrue and I again explained that blinking would mean that he would be opening his eyes. I showed him the patient's eyes and pupils and how they were not reactive to light and that he did not move them or track my finger. He told me that it was because there was too much \"crust\" in his eyes for them to move. I wiped the patient's eyes and showed him that the \"crust\" was actually edema/his actual eyeball. The patient did not believe me and wanted the doctor to come see the \"improvements\". MD PerfectServed to come to bedside. While doctor was in route, son was in room alone with patient and said that he was wiping tears from patient's eyes and again believed that these were signs of him \"waking\" up and that the patient should be transferred. Dr. Siva Raymond thoroughly explained in several different ways again that we have contacted other hospitals to transfer out, but that the case was denied d/t patient condition. Dr. Siva Raymond even offered to try to get a paper trail for the son because he did not believe that we have reached out.  After long discussion, the son said he no

## 2021-02-11 NOTE — PROGRESS NOTES
[address] [place] [address] or Facility: [facility]   From: Ladan Dominguez   RE: Godfrey Dao 1950 RM: [id number]     He believes now that because his eyes are twitching that the patient will be accepted at [facility] or [facility].

## 2021-02-11 NOTE — PROGRESS NOTES
Visible seizure-like activity noted at:   2/10 - 0750, 1325, [facility], [address] [place] [address]  2/11 - 1859 [facility], [address] [place] [address]

## 2021-02-12 NOTE — PROGRESS NOTES
Comprehensive Nutrition Assessment    Type and Reason for Visit:  Reassess    Nutrition Recommendations/Plan:   1. Osmolite 1.2 (standard formula without fiber) at 25 mL per hour today per Dr. Sweta Mckinney. 2. If pt tolerates tube feeds at 25 mL per hour, consider slow advancement towards goal rate as appropriate per MD.   3. Recommend Osmolite 1.2 (standard formula without fiber) at eventual goal rate 70 mL per hour to provide 1680 mL total volume, 2016 calories, 93 grams protein, 1378 mL free water. 4. Continue water bolus 150 mL q 6 hours. Increase flush if Na increases greater than 145 mEq/L.  5. Ensure head of bed is 30 - 45 degrees during continuous or bolus gastric feeding and for one hour after bolus. Turn off the feeding if head of bed is lowered less than 30 degrees. 6. Monitor for tolerance (bowel habits, N/V, cramping, abdominal exam findings: distended, firm, tense, guarded, discomfort). Nutrition Assessment:  Pt remains intubated and sedated. Propofol infusing at 21.9 mL per hour to provide 578 calories from fat daily. Pt tolerating Osmolite 1.2 at decreased rate 10 mL per hour after rate was decreased yesterday d/t pt having tube feed in his mouth. Pt with smear BM 2/10. Senokot was ordered nightly on 2/11, no further BM yet. Per Dr. Sweta Mckinney, [facility] to increase tube feeds to 25 mL per hour today and if pt tolerates, can consider rate advancement tomorrow. Will continue to monitor for tolerance. Malnutrition Assessment:  Malnutrition Status:  No malnutrition      Estimated Daily Nutrient Needs:  Energy (kcal):  5829-6369(95-94 kemal/82kgs); Weight Used for Energy Requirements:  Admission(82 kg)     Protein (g):  (1.2-2.0g/82kg); Weight Used for Protein Requirements:  Admission        Fluid (ml/day):   ; Method Used for Fluid Requirements:  1 ml/kcal      Nutrition Related Findings:  Trace generalized edema. +1 pitting RUE edema. +2 pitting LUE edema. +1 pitting BLE edema. +4.6 liters. Smear BM 2/10. Wounds:  None       Current Nutrition Therapies:    Current Tube Feeding (TF) Orders:  · Feeding Route: Orogastric  · Formula: Standard without Fiber  · Schedule: Continuous  · Water Flushes: 150 mL q 6 hours  · Current TF & Flush Orders Provides: Osmolite 1.2 at 25 mL per hour to provide 600 mL total volume, 720 calories, 33 grams protein, 492 mL free water  · Goal TF & Flush Orders Provides: Osmolite 1.2 at 70 mL per hour to provide 1680 mL total volume, 2016 calories, 93 grams protein, 1378 mL free water. Anthropometric Measures:  · Height: 5' 7\" (170.2 cm)  · Current Body Weight: 187 lb (84.8 kg)   · Admission Body Weight: 182 lb (82.6 kg)    · Ideal Body Weight: 148 lbs; % Ideal Body Weight 125.7 %   · BMI: 29.3  · BMI Categories: Overweight (BMI 25.0-29. 9)       Nutrition Diagnosis:   · Inadequate oral intake related to impaired respiratory function as evidenced by intubation      Nutrition Interventions:   Food and/or Nutrient Delivery:  Continue Current Tube Feeding  Nutrition Education/Counseling:  Education not indicated   Coordination of Nutrition Care:  Continue to monitor while inpatient    Goals:  Pt will mckenzie TF at goal rate       Nutrition Monitoring and Evaluation:   Behavioral-Environmental Outcomes:  None Identified   Food/Nutrient Intake Outcomes:  Enteral Nutrition Intake/Tolerance  Physical Signs/Symptoms Outcomes:  GI Status, Weight, Fluid Status or Edema     Discharge Planning:     Too soon to determine     Electronically signed by Angel Hogue RD, LD on 2/12/21 at 11:49 AM EST    Contact: [phone]

## 2021-02-12 NOTE — PROGRESS NOTES
[facility]    2/12/2021 5:58 PM        Name: Libertad Gallardo . Admitted: 2/3/2021  Primary Care Provider: Poppy Cardoso MD (Tel: [phone])    Brief Course:   Patient is 65years old male with past medical history significant for hypertension GERD who presented after suffering cardiac arrest, patient was found down unresponsive by his son, CPR was not initiated, on EMS arrival he was found to be in V. fib arrest, patient was defibrillated, amiodarone and epinephrine with 5 rounds of CPR, in ER he continued to DNR cardiac arrest ROSC was not achieved for prolonged period of time. He was admitted to ICU for further evaluation is currently intubated and sedated    CC: Status post cardiac arrest    Subjective:   He remains intubated and sedated  He is having his thick secretions from his ET tube is were sent and it is growing Haemophilus influenza.         Reviewed interval ancillary notes    Current Medications      cefTRIAXone (ROCEPHIN) 1000 mg in sterile water 10 mL IV syringe, Q24H      senna (SENOKOT) tablet 8.6 mg, Nightly      docusate (COLACE) 50 MG/5ML liquid 100 mg, Daily      enoxaparin (LOVENOX) injection 40 mg, Daily      LORazepam (ATIVAN) injection 5 mg, Q4H PRN      acetaminophen (TYLENOL) tablet 650 mg, Q4H PRN      albuterol sulfate  (90 Base) MCG/ACT inhaler 6 puff, Q4H      midazolam (VERSED) 100 mg in dextrose 5 % 100 mL infusion, Continuous      levetiracetam (KEPPRA) 1000 mg/100 mL IVPB, Q12H      fosphenytoin (CEREBYX) 100 mg PE in dextrose 5 % 50 mL IVPB (maintenance dose), Q12H      acetaminophen (TYLENOL) suppository 650 mg, Q4H PRN      fentaNYL 10 mcg/mL infusion, Continuous      propofol injection, Titrated      sodium chloride flush 0.9 % injection 10 mL, 2 times per day      sodium chloride flush 0.9 % injection 10 mL, PRN      promethazine (PHENERGAN) tablet 12.5 mg, Q6H PRN    Or      ondansetron (ZOFRAN) injection 4 mg, Q6H PRN      potassium chloride 10 mEq/100 mL IVPB (Peripheral Line), PRN      magnesium sulfate 2000 mg in 50 mL IVPB premix, PRN      pantoprazole (PROTONIX) injection 40 mg, Daily        Objective:  /65   Pulse 84   Temp 100.8 °F (38.2 °C) (Bladder)   Resp 23   Ht 5' 7\" (1.702 m)   Wt 187 lb 6.3 oz (85 kg)   SpO2 93%   BMI 29.35 kg/m²     Intake/Output Summary (Last 24 hours) at 2/12/2021 1758  Last data filed at 2/12/2021 0541  Gross per 24 hour   Intake 990.6 ml   Output 615 ml   Net 375.6 ml      Wt Readings from Last 3 Encounters:   02/12/21 187 lb 6.3 oz (85 kg)   03/11/19 176 lb 4 oz (79.9 kg)   09/19/16 175 lb (79.4 kg)       General appearance:  Not seizing at the moment but seizes with any suctioning or positioning of the patient   Eyes: Sclera clear. Pupils equal.  ENT: Moist oral mucosa. Trachea midline, no adenopathy. Cardiovascular: Regular rhythm, normal S1, S2. No murmur. No edema in lower extremities  Respiratory: vented, clear. GI: Abdomen soft, no tenderness, not distended, normal bowel sounds  Musculoskeletal: No cyanosis in digits, neck supple  Neurology: Intubated  Psych: Intubated sedated  Skin: Warm, dry, normal turgor  Extremity exam shows brisk capillary refill. Peripheral pulses are palpable in lower extremities     Labs and Tests:  CBC:   Recent Labs     02/10/21  0533 02/11/21  0604 02/12/21  0530   WBC 11.9* 13.6* 11.2*   HGB 10.5* 11.7* 10.2*    351 429     BMP:    Recent Labs     02/10/21  0533 02/11/21  0604 02/12/21  0530   * 140 141   K 3.8 3.8 3.8   * 105 108   CO2 29 27 29   BUN 17 15 17   CREATININE 0.6* 0.6* 0.7*   GLUCOSE 125* 111* 123*     Hepatic:   No results for input(s): AST, ALT, ALB, BILITOT, ALKPHOS in the last 72 hours. XR CHEST PORTABLE   Final Result   Stable support apparatus.       Pulmonary edema with probable small left-sided pleural effusion and bibasilar   airspace disease, atelectasis versus pneumonia. No substantial change. XR ABDOMEN (KUB) (SINGLE AP VIEW)   Final Result   Nonobstructive bowel gas pattern. XR CHEST PORTABLE   Final Result   1. Stable appropriate positions of support apparatus. 2. Slight interval progression of mild CHF and bibasilar airspace disease. CT HEAD WO CONTRAST   Final Result   Loss of gray-white matter differentiation bilaterally. Cytotoxic edema is   considered. Global hypoxic injury versus postictal state. Acute or chronic sinusitis of the paranasal sinuses is again noted. Bilateral mastoid effusions. Critical results were called by Dr. Rena Mckeon MD to [facility]   on 2/9/2021 at 19:32. RECOMMENDATIONS:   When feasible follow-up MRI would be helpful. XR CHEST PORTABLE   Final Result   Status post readjustment of the gastric tube with the tip now in the body of   the stomach and in good position. Nonspecific gas pattern. No change in the ET tube position. Stable cardiomegaly with slowly resolving pulmonary congestion and decreasing   perihilar and bibasilar opacities. XR CHEST PORTABLE   Final Result   Slightly improved bilateral airspace opacities. XR ABDOMEN FOR NG/OG/NE TUBE PLACEMENT   Final Result   Enteric tube tip and side port project over the stomach. XR CHEST PORTABLE   Final Result   Slightly decreased right-sided pulmonary disease but increased left-sided   pulmonary disease with new consolidative disease in the left lung base and   possible new left pleural effusion         CT CHEST WO CONTRAST   Final Result   Cervical: Degenerative disc disease. Trace anterolisthesis of T1 on T2. No   gross fracture. Chest: Diffuse bilateral heterogeneous opacity, greatest within the lower   lobes, for which leading considerations include pulmonary edema, pneumonia,   contusion, or hemorrhage. Multiple bilateral anterior rib fractures as discussed above. CT CERVICAL SPINE WO CONTRAST   Final Result   Cervical: Degenerative disc disease. Trace anterolisthesis of T1 on T2. No   gross fracture. Chest: Diffuse bilateral heterogeneous opacity, greatest within the lower   lobes, for which leading considerations include pulmonary edema, pneumonia,   contusion, or hemorrhage. Multiple bilateral anterior rib fractures as discussed above. CT HEAD WO CONTRAST   Final Result   Motion limited exam demonstrating atrophy and small vessel ischemic disease. Moderate paranasal sinus disease. XR CHEST PORTABLE   Final Result   Cardiomegaly with pulmonary edema             Discussed with ICU nurse at bedside    Problem List  Active Problems:    Hypoxic ischemic encephalopathy    New onset seizure ([facility] [address]    Acute encephalopathy    Acute respiratory failure with hypoxia (HCC)  Resolved Problems:    * No resolved hospital problems. *       Assessment & Plan:     Status post V. fib arrest  Acute hypoxic respiratory failure  Anoxic brain injury  Seizure/ status epilepticus  Aspiration pneumonia  Acute kidney injury  Cardiogenic shock    V.fib arrest in the field without bystander CPR, down for long period of time,  leading to anoxic brain injury  He is not responding to any medications. And continues to seize  Family does not seem to understand that this is irreversible, they continue to say to \"do everything\"   We have explained that there is nothing else to do. I suspect that  I will have to attempt transfer to a place with continuous EEG.   Covid testing negative  On Keppra and fosphenytoin, versed gtt, IV ativan, propofol  Neurology following   Keppra was increased to 1 g twice daily  Had Ativan, will keep as needed  ET scan done yesterday of the brain demonstrates the lack of differentiation between gray and white matter which is consistent with severe anoxic brain injury additionally there is noted toxic vasogenic edema. cardiogenic shock. He did have elevated troponin and reduced ejection fraction. Continues to require norepinephrine and support of hypotension from cardiogenic shock. EF of  50%  No invasive cardiac procedure at this time  Continue supportive management  Serial cardiac enzyme  Echo noted as above    Haemophilus influenza pneumonia  He was started on vancomycin and cefepime  Antibiotic was discontinued by [facility], started on Unasyn for aspiration pneumonia  Had since been discontinued  Now resuming the patient on antibiotics and I have started him on Rocephin for Haemophilus influenza. Overall prognosis terrible. I discussed with children who are waiting on additional family members. He understands the poor prognosis, he will talk to the rest of the family in regards of CODE STATUS, currently we will keep patient full code. Patient family to decide further goals of care. Came back on 2/9/2021 and said they will wait 25 days  To make a decision for religous reasons. I have requested and ethics consult. IV Access: Central line  Stratton: Yes  Diet: DIET TUBE FEED CONTINUOUS/CYCLIC NPO; STANDARD WITHOUT FIBER; Orogastric; Continuous  Code:Full Code  DVT PPX we will hold today, noted bleeding again from ET tube. SCD  Disposition continue ICU care, family to decide further goals of care    Family has been difficult to deal with overnight they called 911 to have the patient taken to a different hospital which is not possible. He did not trust to this team and think that we are not doing anything to take care of the patient. They did not even believe that I have made phone calls to other hospitals and attempt to get the patient transferred. Prognosis: Dismal as the patient will not recover from this he CT scan demonstrates the loss of differentiation between gray and white matter consistent with severe anoxic brain injury.   Additionally for cultural reasons the family does not want to make a decision about the potential withdrawal of care for 25 days. With these demands in mind as well as the patient's grim prognosis my recommendation would be to make this patient a DO NOT RESUSCITATE comfort care and then not withdrawal but also not escalate any care till the family ready. Risk management needs to be involved in any decisions that are made family members need to be limited in their visiting status if they have caused significant issues for the nursing staff as well have been threatening. Due to the immediate potential for life-threatening deterioration due to status post cardiac arrest, I spent 35 minutes providing critical care. This time is excluding time spent performing procedures.       Danny Sorensen MD   2/12/2021 5:58 PM

## 2021-02-12 NOTE — PROGRESS NOTES
Handoff report received and patient assessment completed. Sedated on ventilator. RASS -5. Pupils round and equal but nonreactive to light or accommodation. Slight corneal reflex noted to R eye, absent on left. No response to any noxious stimuli peripherally. Plantar reflexes absent. Guarded with oral care d/t multiple loose teeth. Difficulty performing ET suctioning d/t small ET tube, but no cough present with inline suction. Attempted conservative sedation wean to evaluate mental status, but patient immediately began with generalized tremors, RR to 40's and vent asynchrony. PRN ativan administered and upward titration of sedation. Temp 101.6- PRN tylenol given. VS otherwise stable. Trickle tube feeds per OGT. Abdomen distended but soft with hypoactive BS. Generalized edema to note. Scleral edema bilaterally. Stratton intact draining dark green urine. Call received from patient's son, Richie Jose, thorough update given including events listed above. Family is still insisting patient be transferred to another facility. MD aware.

## 2021-02-12 NOTE — PROGRESS NOTES
[facility] Pul[facility] Critical [facility]    Follow Up Note    Subjective:   CHIEF COMPLAINT / HPI:   Chief Complaint   Patient presents with    Cardiac Arrest     Pt found down by family and into cardiac arrest when squad arrived. Pt in vfib 2 shocks delivered and then tamponade. 300 amiodarone given and sodium bicarb. Epi x5 given by squad. Virgil device in place upon arrival, PEA     Interval history: Patient originally admitted 2/3/2021 after cardiac arrest.  He was found down at home by family. EMS found him in V. fib. He was transported to ED where he remained in cardiac arrest.  [facility] Since resuscitation in ICU he has had recurrent seizures. Patient continues to demonstrate seizure activity with almost any stimulation. This is well documented by nursing and other physician notes as well. Family is not accepting of the patient's condition/prognosis. They have requested transfer to another facility. Hospitalists have made efforts to transfer the patient. Due to his condition, he has been denied transfer by  and [facility]. Past Medical History:    Reviewed; no changes    Social History:    Reviewed; no changes    REVIEW OF SYSTEMS:    ROS is unobtainable due to his critical illness. Objective:   PHYSICAL EXAM:        VITALS:  /70   Pulse 95   Temp 100.6 °F (38.1 °C)   Resp (!) 33   Ht 5' 7\" (1.702 m)   Wt 187 lb 6.3 oz (85 kg)   SpO2 94%   BMI 29.35 kg/m²  on mechanical ventilation     24HR INTAKE/OUTPUT:      Intake/Output Summary (Last 24 hours) at 2/12/2021 0915  Last data filed at 2/12/2021 0541  Gross per 24 hour   Intake 1619.6 ml   Output 765 ml   Net 854.6 ml       CONSTITUTIONAL: Nonresponsive on propofol, fentanyl, Keppra and fosphenytoin. Frequent seizure activity as described but he is not seizing at the time of my visit.     LUNGS:  No increased work of breathing and clear to auscultation, no crackles or wheezes  CARDIOVASCULAR: S1 and S2 and no JVD  ABDOMEN:  normal bowel sounds, non-distended and non-tender to palpation  EXT: No edema, no calf tenderness. Pulses are present bilaterally. NEUROLOGIC: Nonresponsive  SKIN:  normal skin color, texture, turgor, no redness, warmth, or swelling at IV sites    DATA:    CBC:  Recent Labs     02/10/21  0533 02/11/21  0604 02/12/21  0530   WBC 11.9* 13.6* 11.2*   RBC 3.79* 4.13* 3.70*   HGB 10.5* 11.7* 10.2*   HCT 32.6* 35.8* 31.8*    351 429   MCV 86.0 86.6 86.0   MCH 27.8 28.2 27.6   MCHC 32.4 32.6 32.1   RDW 13.3 13.3 13.2   BANDSPCT  --  1  --       BMP:  Recent Labs     02/10/21  0533 02/11/21  0604 02/12/21  0530   * 140 141   K 3.8 3.8 3.8   * 105 108   CO2 29 27 29   BUN 17 15 17   CREATININE 0.6* 0.6* 0.7*   CALCIUM 8.0* 8.2* 8.2*   GLUCOSE 125* 111* 123*      ABG:  No results for input(s): PHART, KGJ8YBL, PO2ART, ALM3QCB, N1GPFHMM, BEART in the last 72 hours. Cultures:    Abx:    Radiology Review:  Pertinent images / reports were reviewed as a part of this visit. Assessment:     1. Status post cardiopulmonary arrest with ROSC  2. Acute hypercapnic/hypoxemic respiratory failure  3. Severe anoxic encephalopathy  4. Aspiration pneumonia  5. Cardiogenic shock    I have reviewed laboratories, medical records and images for this visit    Patient appears to have profound anoxic encephalopathy following likely arrhythmic arrest with prolonged resuscitation before obtaining ROSC. He has had both gross clinical seizure activity and seizure activity on EEG. He is on propofol, fentanyl, Keppra and Dilantin. Still having breakthrough seizures. CT of the head was repeated and revealed loss of gray-white matter differentiation consistent with hypoxic injury. Neurology is also following. Hemodynamics have stabilized and the patient is no longer requiring norepinephrine. He did have troponin elevation on admission.   Echocardiogram revealed LVEF approximately 50% with abnormal septal wall motion. There is also indeterminate diastolic function. Patient continues to require mechanical ventilation mostly because of his neurologic status. I have reviewed and adjusted the ventilator at the bedside. Current settings are AC/PC 15/5 and FiO2 0.4. No new ABG today. He is having copious purulent secretions. Concern for VAP. Send sputum culture, check chest x-ray and procalcitonin. He is not on antibiotics at this point. She Guevara He did complete a course of Unasyn over concern for aspiration at the time of admission. Prognosis for functional recovery is poor now 8 days into his hospitalization. Family is struggling with his diagnosis and prognosis. At this point, their goal is to have him transferred to another facility. However, he has been denied transfer on the grounds that nothing further can be done for him. Mya with RN, RT and Dr. Romario Hayden at the bedside    Total critical care time caring for this patient with life threatening illness, including direct patient contact, management of life support systems, review of data including imaging and labs, discussions with other team members and physicians is at least 32 minutes so far today, excluding procedures.

## 2021-02-12 NOTE — PROGRESS NOTES
No significant changes overnight. Pt does have moderate cough with inline suctioning this AM, but no other changes in neuro response. High doses of sedation for vent synchrony and seizure control. Labs drawn as ordered. Bath given, hair washed, linens changed. R fem CVC dressing changed per sterile technique. Will cont to monitor.

## 2021-02-12 NOTE — PROGRESS NOTES
Sedation increased d/t vent asynchrony and RR 40's. No further seizures to note since ativan administration. Remains febrile despite tylenol, was pan cx'd on previous shift with results pending.

## 2021-02-13 NOTE — PROGRESS NOTES
[facility] Pulmonary/CCM Progress note      Admit Date: 2/3/2021    Chief Complaint: Cardiac arrest    Subjective: Interval History: Continues to have generalized seizures when sedation is turned down as per RN report, on midazolam,propofol and fentanyl. Hemodynamically stable. Low-grade fevers, now noted to have H. influenzae pneumonia    Scheduled Meds:   influenza virus vaccine  0.5 mL Intramuscular Prior to discharge    cefTRIAXone (ROCEPHIN) IV  1,000 mg Intravenous Q24H    senna  1 tablet Oral Nightly    docusate  100 mg Oral Daily    enoxaparin  40 mg Subcutaneous Daily    albuterol sulfate HFA  6 puff Inhalation Q4H    levetiracetam  1,000 mg Intravenous Q12H    fosphenytoin (CEREBYX) maintenance dose IVPB  2.5 mg PE/kg/day Intravenous Q12H    sodium chloride flush  10 mL Intravenous 2 times per day    pantoprazole  40 mg Intravenous Daily     Continuous Infusions:   midazolam 10 mg/hr (02/13/21 0631)    fentaNYL 50 mcg/hr (02/13/21 0631)    propofol 50 mcg/kg/min (02/13/21 1513)     PRN Meds:LORazepam, acetaminophen, acetaminophen, sodium chloride flush, promethazine **OR** ondansetron, potassium chloride, magnesium sulfate    Review of Systems  Unable to obtain due to intubation/unresponsive status    Objective:     Patient Vitals for the past 8 hrs:   BP Temp Temp src Pulse Resp SpO2   02/13/21 1542 -- -- -- 71 23 92 %   02/13/21 1400 106/61 99.6 °F (37.6 °C) CORE 77 23 91 %   02/13/21 1200 109/66 99.8 °F (37.7 °C) CORE 81 23 94 %   02/13/21 1122 -- -- -- 81 23 94 %   02/13/21 1000 110/68 -- -- 81 23 92 %   02/13/21 0900 121/67 -- -- 86 23 93 %     I/O last 3 completed shifts: In: 2161.7 [I.V.:1208.7; NG/GT:853; IV Piggyback:100]  Out: 250 [Urine:250]  No intake/output data recorded.     General Appearance: Unresponsive, in no acute distress  Skin: warm and dry, no rash or erythema  Head: normocephalic and atraumatic  Eyes: pupils equal, round, and reactive to light, extraocular eye movements septal thickening.  There is more focal bibasilar airspace   consolidation, left greater than right with questionable small left pleural   effusion.  No pneumothorax.  Cardiac and mediastinal contours are without   acute process.  Soft tissues and osseous structures are without acute   process.  No significant change compared to most recent studies.           Impression   Stable support apparatus.       Pulmonary edema with probable small left-sided pleural effusion and bibasilar   airspace disease, atelectasis versus pneumonia.  No substantial change. Problem List:     V. fib cardiac arrest  Anoxic brain injury  Seizures  H influenza pneumonia  Assessment/Plan:     Anoxic brain injury from prolonged downtime post V. fib cardiac arrest.  Patient having frequent seizures as witnessed during decreasing sedation. Requiring propofol, fentanyl, Versed and also on Keppra and fosphenytoin. Continues to have breakthrough seizures. CT head consistent with anoxic brain injury-with loss of gray-white differentiation. V. fib cardiac arrest, EF of 50%. No plans for intervention since neurologic prognosis is poor. #Influenza pneumonia, possibly related to VAP, currently receiving Rocephin. WBC 11. Low-grade fevers. Continue with tube feeds. Lovenox and Protonix for prophylaxis    Prognosis is guarded, this has been clearly explained with patient's cousin who was at bedside today on behalf of family. Family are still not clear about their goals of care. Multiple attempts made for transfer of patient to OSH based on family request and has been turned down.     Shmuel Devine MD     Critical care time of 32 minutes excluding procedures

## 2021-02-13 NOTE — PROGRESS NOTES
Patient bathed, and repositioned. Noted seizure, PRN 5 mg of IVP Ativan administered, with effective results. Will continue to monitor closely.

## 2021-02-13 NOTE — PROGRESS NOTES
2 of patient's son's called for update. Updated on patient status. Questions answered, verbalized understanding.

## 2021-02-13 NOTE — PROGRESS NOTES
Shift assessment complete. Pt is intubated and sedated. ETT in place size 7, 26 at the lip. OG in place 55 at the lip. Propofol at 50 mcg/kg/min, Fentanyl at 50 mcg/hr, and Versed at 10 mg/hr. Pupils nonreactive to light. Sclera edematous. RASS -5, no seizure activity noted. No gag or cough present. Lung sounds noted to have rhonchi and wheezing throughout. Current Vent Settings: AC/PC, Rate of 22, PC of 15, FiO2 of 40%, and PEEP of 5. Pt is NSR per monitor. No adventitious heart sounds noted. Bowel sounds hypoactive. Radial and pedal pulses present and palpable. Stratton in place draining dark green urine. . Right Femoral CVC in place, patent and flushing well with brisk blood return. Pt being turned Q2H to prevent skin breakdown, pillow support provided. Will continue to monitor and assess.

## 2021-02-13 NOTE — PROGRESS NOTES
[facility] PROGRESS NOTE    2/14/2021 2:25 PM        Name: Estrada Osborn . Admitted: 2/3/2021  Primary Care Provider: Mathis Bence, MD (Tel: [phone])    Brief Course:   Patient is 65years old male with past medical history significant for hypertension GERD who presented after suffering cardiac arrest, patient was found down unresponsive by his son, CPR was not initiated, on EMS arrival he was found to be in V. fib arrest, patient was defibrillated, amiodarone and epinephrine with 5 rounds of CPR, in ER he continued to DNR cardiac arrest ROSC was not achieved for prolonged period of time. He was admitted to ICU for further evaluation is currently intubated and sedated    CC: Status post cardiac arrest    Subjective:   He remains intubated and sedated  He is having his thick secretions from his ET tube is were sent and it is growing Haemophilus influenza.         Reviewed interval ancillary notes    Current Medications      midazolam PF (VERSED) injection 2 mg, Q10 Min PRN      LORazepam (ATIVAN) injection 4 mg, Q5 Min PRN      morphine (PF) injection 5 mg, Q15 Min PRN      influenza quadrivalent split vaccine (FLUZONE;FLUARIX;FLULAVAL;AFLURIA) injection 0.5 mL, Prior to discharge      cefTRIAXone (ROCEPHIN) 1000 mg in sterile water 10 mL IV syringe, Q24H      senna (SENOKOT) tablet 8.6 mg, Nightly      docusate (COLACE) 50 MG/5ML liquid 100 mg, Daily      enoxaparin (LOVENOX) injection 40 mg, Daily      LORazepam (ATIVAN) injection 5 mg, Q4H PRN      acetaminophen (TYLENOL) tablet 650 mg, Q4H PRN      albuterol sulfate  (90 Base) MCG/ACT inhaler 6 puff, Q4H      midazolam (VERSED) 100 mg in dextrose 5 % 100 mL infusion, Continuous      levetiracetam (KEPPRA) 1000 mg/100 mL IVPB, Q12H      fosphenytoin (CEREBYX) 100 mg PE in dextrose 5 % 50 mL IVPB (maintenance dose), Q12H     acetaminophen (TYLENOL) suppository 650 mg, Q4H PRN      fentaNYL 10 mcg/mL infusion, Continuous      propofol injection, Titrated      sodium chloride flush 0.9 % injection 10 mL, 2 times per day      sodium chloride flush 0.9 % injection 10 mL, PRN      promethazine (PHENERGAN) tablet 12.5 mg, Q6H PRN    Or      ondansetron (ZOFRAN) injection 4 mg, Q6H PRN      potassium chloride 10 mEq/100 mL IVPB (Peripheral Line), PRN      magnesium sulfate 2000 mg in 50 mL IVPB premix, PRN      pantoprazole (PROTONIX) injection 40 mg, Daily        Objective:  BP (!) 160/93   Pulse 129   Temp 101.6 °F (38.7 °C) (Bladder)   Resp (!) 43   Ht 5' 7\" (1.702 m)   Wt 186 lb 11.2 oz (84.7 kg)   SpO2 (!) 67%   BMI 29.24 kg/m²     Intake/Output Summary (Last 24 hours) at 2/14/2021 1425  Last data filed at 2/14/2021 2046  Gross per 24 hour   Intake 2002 ml   Output 1150 ml   Net 852 ml      Wt Readings from Last 3 Encounters:   02/14/21 186 lb 11.2 oz (84.7 kg)   03/11/19 176 lb 4 oz (79.9 kg)   09/19/16 175 lb (79.4 kg)       General appearance:  Not seizing at the moment but seizes with any suctioning or positioning of the patient   Eyes: Sclera clear. Pupils equal.  ENT: Moist oral mucosa. Trachea midline, no adenopathy. Cardiovascular: Regular rhythm, normal S1, S2. No murmur. No edema in lower extremities  Respiratory: vented, clear. GI: Abdomen soft, no tenderness, not distended, normal bowel sounds  Musculoskeletal: No cyanosis in digits, neck supple  Neurology: Intubated  Psych: Intubated sedated  Skin: Warm, dry, normal turgor  Extremity exam shows brisk capillary refill.   Peripheral pulses are palpable in lower extremities     Labs and Tests:  CBC:   Recent Labs     02/12/21  0530 02/14/21  0500   WBC 11.2* 11.3*   HGB 10.2* 10.4*    532*     BMP:    Recent Labs     02/12/21  0530 02/14/21  0500    137   K 3.8 3.7    106   CO2 29 26   BUN 17 15   CREATININE 0.7* 0.6*   GLUCOSE 123* 133* Hepatic:   No results for input(s): AST, ALT, ALB, BILITOT, ALKPHOS in the last 72 hours. XR CHEST PORTABLE   Final Result   Stable support apparatus. Pulmonary edema with probable small left-sided pleural effusion and bibasilar   airspace disease, atelectasis versus pneumonia. No substantial change. XR ABDOMEN (KUB) (SINGLE AP VIEW)   Final Result   Nonobstructive bowel gas pattern. XR CHEST PORTABLE   Final Result   1. Stable appropriate positions of support apparatus. 2. Slight interval progression of mild CHF and bibasilar airspace disease. CT HEAD WO CONTRAST   Final Result   Loss of gray-white matter differentiation bilaterally. Cytotoxic edema is   considered. Global hypoxic injury versus postictal state. Acute or chronic sinusitis of the paranasal sinuses is again noted. Bilateral mastoid effusions. Critical results were called by Dr. Olga Luke MD to [place] Jenny   on 2/9/2021 at 19:32. RECOMMENDATIONS:   When feasible follow-up MRI would be helpful. XR CHEST PORTABLE   Final Result   Status post readjustment of the gastric tube with the tip now in the body of   the stomach and in good position. Nonspecific gas pattern. No change in the ET tube position. Stable cardiomegaly with slowly resolving pulmonary congestion and decreasing   perihilar and bibasilar opacities. XR CHEST PORTABLE   Final Result   Slightly improved bilateral airspace opacities. XR ABDOMEN FOR NG/OG/NE TUBE PLACEMENT   Final Result   Enteric tube tip and side port project over the stomach. XR CHEST PORTABLE   Final Result   Slightly decreased right-sided pulmonary disease but increased left-sided   pulmonary disease with new consolidative disease in the left lung base and   possible new left pleural effusion         CT CHEST WO CONTRAST   Final Result   Cervical: Degenerative disc disease.   Trace anterolisthesis of T1 on T2.  No   gross fracture. Chest: Diffuse bilateral heterogeneous opacity, greatest within the lower   lobes, for which leading considerations include pulmonary edema, pneumonia,   contusion, or hemorrhage. Multiple bilateral anterior rib fractures as discussed above. CT CERVICAL SPINE WO CONTRAST   Final Result   Cervical: Degenerative disc disease. Trace anterolisthesis of T1 on T2. No   gross fracture. Chest: Diffuse bilateral heterogeneous opacity, greatest within the lower   lobes, for which leading considerations include pulmonary edema, pneumonia,   contusion, or hemorrhage. Multiple bilateral anterior rib fractures as discussed above. CT HEAD WO CONTRAST   Final Result   Motion limited exam demonstrating atrophy and small vessel ischemic disease. Moderate paranasal sinus disease. XR CHEST PORTABLE   Final Result   Cardiomegaly with pulmonary edema             Discussed with ICU nurse at bedside    Problem List  Active Problems:    Hypoxic ischemic encephalopathy    New onset seizure ([address]    Acute encephalopathy    Acute respiratory failure with hypoxia (HCC)  Resolved Problems:    * No resolved hospital problems. *       Assessment & Plan:     Status post V. fib arrest  Acute hypoxic respiratory failure  Anoxic brain injury  Seizure/ status epilepticus  Aspiration pneumonia  Acute kidney injury  Cardiogenic shock    V.fib arrest in the field without bystander CPR, down for long period of time,  leading to anoxic brain injury  He is not responding to any medications. And continues to seize  Family does not seem to understand that this is irreversible, they continue to say to \"do everything\"   We have explained that there is nothing else to do. I suspect that  I will have to attempt transfer to a place with continuous EEG.   Covid testing negative  On Keppra and fosphenytoin, versed gtt, IV ativan, propofol  Neurology following   Keppra was increased to 1 g twice daily  Had Ativan, will keep as needed  ET scan done yesterday of the brain demonstrates the lack of differentiation between gray and white matter which is consistent with severe anoxic brain injury additionally there is noted toxic vasogenic edema. cardiogenic shock. He did have elevated troponin and reduced ejection fraction. Continues to require norepinephrine and support of hypotension from cardiogenic shock. EF of  50%  No invasive cardiac procedure at this time  Continue supportive management  Serial cardiac enzyme  Echo noted as above    Haemophilus influenza pneumonia  He was started on vancomycin and cefepime  Antibiotic was discontinued by [facility], started on Unasyn for aspiration pneumonia  Had since been discontinued  Now resuming the patient on antibiotics and I have started him on Rocephin for Haemophilus influenza. Overall prognosis terrible. I discussed with children who are waiting on additional family members. He understands the poor prognosis, he will talk to the rest of the family in regards of CODE STATUS, currently we will keep patient full code. Patient family to decide further goals of care. Came back on 2/9/2021 and said they will wait 25 days  To make a decision for religous reasons. I have requested and ethics consult. IV Access: Central line  [place]: Yes  Diet: DIET TUBE FEED CONTINUOUS/CYCLIC NPO; STANDARD WITHOUT FIBER; Orogastric; Continuous  Code:DNR-CC  DVT PPX we will hold today, noted bleeding again from ET tube. SCD  Disposition continue ICU care, family to decide further goals of care    Family has been difficult to deal with overnight they called 911 to have the patient taken to a different hospital which is not possible. He did not trust to this team and think that we are not doing anything to take care of the patient. They did not even believe that I have made phone calls to other hospitals and attempt to get the patient transferred.     Prognosis: Dismal as the patient will not recover from this he CT scan demonstrates the loss of differentiation between gray and white matter consistent with severe anoxic brain injury. Additionally for cultural reasons the family does not want to make a decision about the potential withdrawal of care for 25 days. With these demands in mind as well as the patient's grim prognosis my recommendation would be to make this patient a DO NOT RESUSCITATE comfort care and then not withdrawal but also not escalate any care till the family ready. Risk management needs to be involved in any decisions that are made family members need to be limited in their visiting status as they have caused significant issues for the nursing staff as well have been threatening. Family discussion today 2/13/2021 with pts cousin who is calm and clear. He will relay info discussed with myself and dr. Maryanne Moreno to the necessary family. Due to the immediate potential for life-threatening deterioration due to status post cardiac arrest, I spent 35 minutes providing critical care. This time is excluding time spent performing procedures.       Serena Schmitt MD   2/14/2021 2:25 PM

## 2021-02-13 NOTE — PROGRESS NOTES
Family asked for cousin to come in and see patient. He is calm and asking questions to help understanding his status. And also requests understanding of brain injury and what that means going forward.

## 2021-02-14 NOTE — PROGRESS NOTES
[facility] Pulmonary/CCM Progress note      Admit Date: 2/3/2021    Chief Complaint: Cardiac arrest    Subjective: Interval History: Plans for withdrawal of care today. Continues to have seizure-like activity when sedation is turned down. Hemodynamically stable, continues to have low-grade fevers. Scheduled Meds:   influenza virus vaccine  0.5 mL Intramuscular Prior to discharge    cefTRIAXone (ROCEPHIN) IV  1,000 mg Intravenous Q24H    senna  1 tablet Oral Nightly    docusate  100 mg Oral Daily    enoxaparin  40 mg Subcutaneous Daily    albuterol sulfate HFA  6 puff Inhalation Q4H    levetiracetam  1,000 mg Intravenous Q12H    fosphenytoin (CEREBYX) maintenance dose IVPB  2.5 mg PE/kg/day Intravenous Q12H    sodium chloride flush  10 mL Intravenous 2 times per day    pantoprazole  40 mg Intravenous Daily     Continuous Infusions:   midazolam 8 mg/hr (02/14/21 0948)    fentaNYL Stopped (02/14/21 1043)    propofol Stopped (02/14/21 1100)     PRN Meds:midazolam, LORazepam, morphine, LORazepam, acetaminophen, acetaminophen, sodium chloride flush, promethazine **OR** ondansetron, potassium chloride, magnesium sulfate    Review of Systems  Unable to obtain due to intubation/unresponsive status    Objective:     Patient Vitals for the past 8 hrs:   BP Temp Temp src Pulse Resp SpO2   02/14/21 1200 (!) 160/93 101.6 °F (38.7 °C) Bladder 129 (!) 43 (!) 67 %   02/14/21 1100 (!) 153/88 -- -- 93 29 93 %   02/14/21 1000 (!) 157/90 -- -- 91 23 95 %   02/14/21 0900 (!) 148/79 -- -- 85 23 94 %   02/14/21 0822 -- -- -- 84 23 92 %   02/14/21 0800 (!) 158/88 100.4 °F (38 °C) Bladder 86 24 95 %     I/O last 3 completed shifts: In: 2002 [I.V.:1044; NG/GT:958]  Out: 1150 [Urine:1150]  No intake/output data recorded.     General Appearance: Unresponsive, in no acute distress  Skin: warm and dry, no rash or erythema  Head: normocephalic and atraumatic  Eyes: pupils equal, round, and reactive to light, extraocular eye movements intact, conjunctivae normal  ENT: external ear and ear canal normal bilaterally, nose without deformity, nasal mucosa and turbinates normal  Neck: supple and non-tender without mass, no cervical lymphadenopathy  Pulmonary/Chest: clear to auscultation bilaterally- no wheezes, rales or rhonchi, normal air movement, no respiratory distress  Cardiovascular: normal rate, regular rhythm,  no murmurs, rubs, distal pulses intact, no carotid bruits  Abdomen: soft, non-tender, non-distended, normal bowel sounds, no masses or organomegaly  Lymph Nodes: Cervical, supraclavicular normal  Extremities: no cyanosis, clubbing or edema  Musculoskeletal: normal range of motion, no joint swelling, deformity or tenderness  Neurologic: Unresponsive, no focal neurologic deficits    Data Review:  CBC:   Lab Results   Component Value Date    WBC 11.3 02/14/2021    RBC 3.73 02/14/2021     BMP:   Lab Results   Component Value Date    GLUCOSE 133 02/14/2021    CO2 26 02/14/2021    BUN 15 02/14/2021    CREATININE 0.6 02/14/2021    CALCIUM 8.2 02/14/2021     ABG:   Lab Results   Component Value Date    ZWN5YRQ 28.0 02/08/2021    BEART 2.5 02/08/2021    J6YZAYCM 97.7 02/08/2021    PHART 7.388 02/08/2021    GWY8XYT 46.5 02/08/2021    PO2ART 91.9 02/08/2021    GDY1IVT 66.0 02/08/2021       Radiology: All pertinent images / reports were reviewed as a part of this visit. Narrative   EXAMINATION:   ONE XRAY VIEW OF THE CHEST       2/12/2021 12:04 pm       COMPARISON:   Chest radiograph February 11, 2021 and priors.       HISTORY:   ORDERING SYSTEM PROVIDED HISTORY: dyspnea   TECHNOLOGIST PROVIDED HISTORY:   Reason for exam:->dyspnea   Reason for Exam: dyspnea   Acuity: Acute   Type of Exam: Initial       FINDINGS:   Endotracheal tube is again seen with tip in the midthoracic trachea.    Nasogastric tube tip extends over the distal stomach.  The lung volumes are   low.  There are persistent mild diffuse bilateral hazy opacities with interstitial septal thickening.  There is more focal bibasilar airspace   consolidation, left greater than right with questionable small left pleural   effusion.  No pneumothorax.  Cardiac and mediastinal contours are without   acute process.  Soft tissues and osseous structures are without acute   process.  No significant change compared to most recent studies.           Impression   Stable support apparatus.       Pulmonary edema with probable small left-sided pleural effusion and bibasilar   airspace disease, atelectasis versus pneumonia.  No substantial change. Problem List:     V. fib cardiac arrest  Anoxic brain injury  Seizures  H influenza pneumonia  Assessment/Plan:     Anoxic brain injury from prolonged downtime post V. fib cardiac arrest.  Patient having frequent seizures as witnessed during decreasing sedation. Requiring propofol, fentanyl, Versed and also on Keppra and fosphenytoin. Continues to have breakthrough seizures. CT head consistent with anoxic brain injury-with loss of gray-white differentiation. Continues to have seizure-like activity when propofol does is turned down. V. fib cardiac arrest, EF of 50%. Intervention was put on hold due to anoxic brain injury. #Influenza pneumonia, possibly related to VAP, treated with Rocephin. Family finally agreed for withdrawal of care today. Patient was preloaded with Ativan, continues on IV Ativan drip. Following extubation patient continued to have refractory seizures requiring frequent Ativan and morphine use. Finally, seizures appear to be somewhat better controlled. Patient is likely to pass away shortly.     Shmuel Devine MD

## 2021-02-14 NOTE — PROGRESS NOTES
[facility] PROGRESS NOTE    2/14/2021 10:43 AM        Name: Leesa Wang . Admitted: 2/3/2021  Primary Care Provider: Robert Tay MD (Tel: [phone])    Brief Course:   Patient is 65years old male with past medical history significant for hypertension GERD who presented after suffering cardiac arrest, patient was found down unresponsive by his son, CPR was not initiated, on EMS arrival he was found to be in V. fib arrest, patient was defibrillated, amiodarone and epinephrine with 5 rounds of CPR, in ER he continued to DNR cardiac arrest ROSC was not achieved for prolonged period of time. He was admitted to ICU for further evaluation is currently intubated and sedated    CC: Status post cardiac arrest    Subjective:   He remains intubated and sedated  He is having his thick secretions from his ET tube is were sent and it is growing Haemophilus influenza.         Reviewed interval ancillary notes    Current Medications      midazolam PF (VERSED) injection 4 mg, Once      midazolam PF (VERSED) injection 2 mg, Q10 Min PRN      influenza quadrivalent split vaccine (FLUZONE;FLUARIX;FLULAVAL;AFLURIA) injection 0.5 mL, Prior to discharge      cefTRIAXone (ROCEPHIN) 1000 mg in sterile water 10 mL IV syringe, Q24H      senna (SENOKOT) tablet 8.6 mg, Nightly      docusate (COLACE) 50 MG/5ML liquid 100 mg, Daily      enoxaparin (LOVENOX) injection 40 mg, Daily      LORazepam (ATIVAN) injection 5 mg, Q4H PRN      acetaminophen (TYLENOL) tablet 650 mg, Q4H PRN      albuterol sulfate  (90 Base) MCG/ACT inhaler 6 puff, Q4H      midazolam (VERSED) 100 mg in dextrose 5 % 100 mL infusion, Continuous      levetiracetam (KEPPRA) 1000 mg/100 mL IVPB, Q12H      fosphenytoin (CEREBYX) 100 mg PE in dextrose 5 % 50 mL IVPB (maintenance dose), Q12H      acetaminophen (TYLENOL) suppository 650 mg, Q4H PRN     fentaNYL 10 mcg/mL infusion, Continuous      propofol injection, Titrated      sodium chloride flush 0.9 % injection 10 mL, 2 times per day      sodium chloride flush 0.9 % injection 10 mL, PRN      promethazine (PHENERGAN) tablet 12.5 mg, Q6H PRN    Or      ondansetron (ZOFRAN) injection 4 mg, Q6H PRN      potassium chloride 10 mEq/100 mL IVPB (Peripheral Line), PRN      magnesium sulfate 2000 mg in 50 mL IVPB premix, PRN      pantoprazole (PROTONIX) injection 40 mg, Daily        Objective:  BP (!) 152/90   Pulse 84   Temp 100.4 °F (38 °C) (Core)   Resp 23   Ht 5' 7\" (1.702 m)   Wt 186 lb 11.2 oz (84.7 kg)   SpO2 92%   BMI 29.24 kg/m²     Intake/Output Summary (Last 24 hours) at 2/14/2021 1043  Last data filed at 2/14/2021 9621  Gross per 24 hour   Intake 2779.67 ml   Output 1150 ml   Net 1629.67 ml      Wt Readings from Last 3 Encounters:   02/14/21 186 lb 11.2 oz (84.7 kg)   03/11/19 176 lb 4 oz (79.9 kg)   09/19/16 175 lb (79.4 kg)       General appearance:  Not seizing at the moment but seizes with any suctioning or positioning of the patient   Eyes: Sclera clear. Pupils equal.  ENT: Moist oral mucosa. Trachea midline, no adenopathy. Cardiovascular: Regular rhythm, normal S1, S2. No murmur. No edema in lower extremities  Respiratory: vented, clear. GI: Abdomen soft, no tenderness, not distended, normal bowel sounds  Musculoskeletal: No cyanosis in digits, neck supple  Neurology: Intubated  Psych: Intubated sedated  Skin: Warm, dry, normal turgor  Extremity exam shows brisk capillary refill.   Peripheral pulses are palpable in lower extremities     Labs and Tests:  CBC:   Recent Labs     02/12/21  0530 02/14/21  0500   WBC 11.2* 11.3*   HGB 10.2* 10.4*    532*     BMP:    Recent Labs     02/12/21  0530 02/14/21  0500    137   K 3.8 3.7    106   CO2 29 26   BUN 17 15   CREATININE 0.7* 0.6*   GLUCOSE 123* 133*     Hepatic:   No results for input(s): AST, ALT, ALB, BILITOT, ALKPHOS in the last 72 hours. XR CHEST PORTABLE   Final Result   Stable support apparatus. Pulmonary edema with probable small left-sided pleural effusion and bibasilar   airspace disease, atelectasis versus pneumonia. No substantial change. XR ABDOMEN (KUB) (SINGLE AP VIEW)   Final Result   Nonobstructive bowel gas pattern. XR CHEST PORTABLE   Final Result   1. Stable appropriate positions of support apparatus. 2. Slight interval progression of mild CHF and bibasilar airspace disease. CT HEAD WO CONTRAST   Final Result   Loss of gray-white matter differentiation bilaterally. Cytotoxic edema is   considered. Global hypoxic injury versus postictal state. Acute or chronic sinusitis of the paranasal sinuses is again noted. Bilateral mastoid effusions. Critical results were called by Dr. Bettie Dejesus MD to Julia Corralesh   on 2/9/2021 at 19:32. RECOMMENDATIONS:   When feasible follow-up MRI would be helpful. XR CHEST PORTABLE   Final Result   Status post readjustment of the gastric tube with the tip now in the body of   the stomach and in good position. Nonspecific gas pattern. No change in the ET tube position. Stable cardiomegaly with slowly resolving pulmonary congestion and decreasing   perihilar and bibasilar opacities. XR CHEST PORTABLE   Final Result   Slightly improved bilateral airspace opacities. XR ABDOMEN FOR NG/OG/NE TUBE PLACEMENT   Final Result   Enteric tube tip and side port project over the stomach. XR CHEST PORTABLE   Final Result   Slightly decreased right-sided pulmonary disease but increased left-sided   pulmonary disease with new consolidative disease in the left lung base and   possible new left pleural effusion         CT CHEST WO CONTRAST   Final Result   Cervical: Degenerative disc disease. Trace anterolisthesis of T1 on T2. No   gross fracture.       Chest: Diffuse bilateral heterogeneous opacity, greatest within the lower   lobes, for which leading considerations include pulmonary edema, pneumonia,   contusion, or hemorrhage. Multiple bilateral anterior rib fractures as discussed above. CT CERVICAL SPINE WO CONTRAST   Final Result   Cervical: Degenerative disc disease. Trace anterolisthesis of T1 on T2. No   gross fracture. Chest: Diffuse bilateral heterogeneous opacity, greatest within the lower   lobes, for which leading considerations include pulmonary edema, pneumonia,   contusion, or hemorrhage. Multiple bilateral anterior rib fractures as discussed above. CT HEAD WO CONTRAST   Final Result   Motion limited exam demonstrating atrophy and small vessel ischemic disease. Moderate paranasal sinus disease. XR CHEST PORTABLE   Final Result   Cardiomegaly with pulmonary edema             Discussed with ICU nurse at bedside    Problem List  Active Problems:    Hypoxic ischemic encephalopathy    New onset seizure ([facility] [address]    Acute encephalopathy    Acute respiratory failure with hypoxia ([facility])  Resolved Problems:    * No resolved hospital problems. *       Assessment & Plan:     Status post V. fib arrest  Acute hypoxic respiratory failure  Anoxic brain injury  Seizure/ status epilepticus  Aspiration pneumonia  Acute kidney injury  Cardiogenic shock    V.fib arrest in the field without bystander CPR, down for long period of time,  leading to anoxic brain injury  He is not responding to any medications. And continues to seize  Family does not seem to understand that this is irreversible, they continue to say to \"do everything\"   We have explained that there is nothing else to do. I suspect that  I will have to attempt transfer to a place with continuous EEG.   Covid testing negative  On Keppra and fosphenytoin, versed gtt, IV ativan, propofol  Neurology following   Keppra was increased to 1 g twice daily  Had Ativan, will keep as needed  ET scan done yesterday of the brain demonstrates the lack of differentiation between gray and white matter which is consistent with severe anoxic brain injury additionally there is noted toxic vasogenic edema. cardiogenic shock. He did have elevated troponin and reduced ejection fraction. Continues to require norepinephrine and support of hypotension from cardiogenic shock. EF of  50%  No invasive cardiac procedure at this time  Continue supportive management  Serial cardiac enzyme  Echo noted as above    Haemophilus influenza pneumonia  He was started on vancomycin and cefepime  Antibiotic was discontinued by [facility], started on Unasyn for aspiration pneumonia  Had since been discontinued  Now resuming the patient on antibiotics and I have started him on Rocephin for Haemophilus influenza. Overall prognosis terrible. I discussed with children who are waiting on additional family members. He understands the poor prognosis, he will talk to the rest of the family in regards of CODE STATUS, currently we will keep patient full code. Patient family to decide further goals of care. Came back on 2/9/2021 and said they will wait 25 days  To make a decision for religous reasons. I have requested and ethics consult. IV Access: Central line  [place]: Yes  Diet: DIET TUBE FEED CONTINUOUS/CYCLIC NPO; STANDARD WITHOUT FIBER; Orogastric; Continuous  Code: [facility]  DVT PPX we will hold today, noted bleeding again from ET tube. SCD  Disposition continue ICU care, family to decide further goals of care    Family here today and is withdrawing care. Will allow 3-4 back in room at a time and then all the children may be present for a while. If he lingers unfortunately we will need to limit the number in the room at the time.     Nahun Mckeon MD   2/14/2021 10:43 AM

## 2021-02-14 NOTE — PROGRESS NOTES
Entered data and then realized column was not at current time. Data entered at 2120, at which time ventilator check was done.

## 2021-02-14 NOTE — PROGRESS NOTES
02/14/21 0822   Vent Patient Data   Plateau Pressure 18 OTI99   Static Compliance 30.1 mL/cmH2O   Dynamic Compliance 26.1 mL/cmH2O

## 2021-02-15 NOTE — DISCHARGE SUMMARY
[facility] Medicine  Death/Discharge Summary    Name:Kymberly Mesa       :  1950              MRN:  [id number]    Admit date:  2/3/2021    Discharge date:  2/15/2021    Admitting Physician:  Isis Sewell MD  Discharge Physician: Bryant Castro MD        Admission Condition:  critical    Discharged Condition:      History of Present Illness: Patient is 65years old male with past medical history significant for hypertension GERD who presented after suffering cardiac arrest, patient was found down unresponsive by his son, CPR was not initiated, on EMS arrival he was found to be in V. fib arrest, patient was defibrillated, amiodarone and epinephrine with 5 rounds of CPR, in ER he continued to DNR cardiac arrest ROSC was not achieved for prolonged period of time.     He was admitted to ICU for further evaluation is currently intubated and sedated       V.fib arrest in the field without bystander CPR, down for long period of time,  leading to anoxic brain injury  He is not responding to any medications. And continues to seize  Covid testing negative  Had Ativan, will keep as needed  CT scan  of the brain demonstrates the lack of differentiation between gray and white matter which is consistent with severe anoxic brain injury additionally there is noted toxic vasogenic edema. Family decided to withdraw. Comfort meds in place         Discharge Physical Exam:    Called by nurse that patient had become asystole on telemetry. Patient seen and examined. No palpable pulse. Pupils fixed and dilated. No cardiac or pulmonary activity. Patient pronounced dead.      Time of Death: 14:38    Cause of Death: Anoxic brain Damage secondary to V.fib cardiac arrest.        Disposition:   [facility]    Time spent on Discharged is 30 minutes      Signed:  Azalia Mays MD  2/15/2021, 2:51 PM

## 2021-02-15 NOTE — PROGRESS NOTES
Patient without respirations or pulse, monitor shows asystole. Family at bedside, aware of patient passing.  Will send perfect serve to Dr. Pete Williamson regarding status

## 2021-02-15 NOTE — PROGRESS NOTES
Patient extubated to comfort care after propofol and Fentanyl stopped;per family request.Patient remains unresponsive. See MAR for detail of comfort meds given. Family at bedside.

## 2021-02-15 NOTE — PROGRESS NOTES
Palliative Care:     Patient extubated 21@ 1145. VS remain unstable. Elevated T 103. P 126/thready R36/labored. Intermittent small seizure like activity apparent. Several female family members at bedside. Discussed current physical status with Dr. Lilibeth Loving. Will increase Morphine 10mg IV @ 30 minutes for comfort/decrease labored respirations. Nurse Emili Wood aware of medication change. Emotional support provided to family. Awaiting son Cameron Miller or Fiorella Refugio to come in. Initial discussion of  Home provided to family at bedside. State patient son's as noted above will provide TEAM with this information. Will continue to support as needed.

## 2021-02-15 NOTE — PROGRESS NOTES
spO2 slowly declining. Continued PRN medication to address comfort and seizures which are frequent. Family at bedside and educated on the process. No further questions at this time. Will continue to monitor.

## 2021-02-16 LAB — HEMATOLOGY PATH CONSULT: NORMAL
